# Patient Record
Sex: FEMALE | Race: WHITE | NOT HISPANIC OR LATINO | Employment: UNEMPLOYED | URBAN - METROPOLITAN AREA
[De-identification: names, ages, dates, MRNs, and addresses within clinical notes are randomized per-mention and may not be internally consistent; named-entity substitution may affect disease eponyms.]

---

## 2018-02-21 ENCOUNTER — TRANSCRIBE ORDERS (OUTPATIENT)
Dept: WOMENS IMAGING | Facility: HOSPITAL | Age: 28
End: 2018-02-21

## 2018-02-21 DIAGNOSIS — O34.32 CERVICAL FUNNELING AFFECTING PREGNANCY IN SECOND TRIMESTER: Primary | ICD-10-CM

## 2018-02-26 ENCOUNTER — OFFICE VISIT (OUTPATIENT)
Dept: OBSTETRICS AND GYNECOLOGY | Facility: HOSPITAL | Age: 28
End: 2018-02-26

## 2018-02-26 ENCOUNTER — HOSPITAL ENCOUNTER (OUTPATIENT)
Dept: WOMENS IMAGING | Facility: HOSPITAL | Age: 28
Discharge: HOME | End: 2018-02-26
Attending: OBSTETRICS & GYNECOLOGY | Admitting: OBSTETRICS & GYNECOLOGY

## 2018-02-26 VITALS
DIASTOLIC BLOOD PRESSURE: 76 MMHG | HEIGHT: 62 IN | BODY MASS INDEX: 42.88 KG/M2 | SYSTOLIC BLOOD PRESSURE: 127 MMHG | WEIGHT: 233 LBS

## 2018-02-26 DIAGNOSIS — N88.3 INCOMPETENT CERVIX: ICD-10-CM

## 2018-02-26 DIAGNOSIS — O34.32 CERVICAL FUNNELING AFFECTING PREGNANCY IN SECOND TRIMESTER: ICD-10-CM

## 2018-02-26 DIAGNOSIS — O26.879 SHORT CERVIX AFFECTING PREGNANCY: Primary | ICD-10-CM

## 2018-02-26 DIAGNOSIS — E66.01 MORBID OBESITY WITH BMI OF 40.0-44.9, ADULT (HCC): ICD-10-CM

## 2018-02-26 PROCEDURE — 76811 OB US DETAILED SNGL FETUS: CPT | Performed by: OBSTETRICS & GYNECOLOGY

## 2018-02-26 PROCEDURE — 76811 OB US DETAILED SNGL FETUS: CPT

## 2018-02-26 PROCEDURE — 99242 OFF/OP CONSLTJ NEW/EST SF 20: CPT | Performed by: OBSTETRICS & GYNECOLOGY

## 2018-02-26 PROCEDURE — 76817 TRANSVAGINAL US OBSTETRIC: CPT

## 2018-02-26 PROCEDURE — 76817 TRANSVAGINAL US OBSTETRIC: CPT | Performed by: OBSTETRICS & GYNECOLOGY

## 2018-02-26 RX ORDER — PRENATAL WITH FERROUS FUM AND FOLIC ACID 3080; 920; 120; 400; 22; 1.84; 3; 20; 10; 1; 12; 200; 27; 25; 2 [IU]/1; [IU]/1; MG/1; [IU]/1; MG/1; MG/1; MG/1; MG/1; MG/1; MG/1; UG/1; MG/1; MG/1; MG/1; MG/1
TABLET ORAL DAILY
COMMUNITY

## 2018-02-26 NOTE — PROGRESS NOTES
Pt denies all s/s PTL.  Denies other problems.  Next OB visit.  Denies having had any genetic screening.

## 2018-02-27 ENCOUNTER — HOSPITAL ENCOUNTER (INPATIENT)
Facility: HOSPITAL | Age: 28
LOS: 3 days | Discharge: HOME | End: 2018-03-02
Attending: OBSTETRICS & GYNECOLOGY | Admitting: OBSTETRICS & GYNECOLOGY

## 2018-02-27 DIAGNOSIS — O26.879 SHORT CERVIX AFFECTING PREGNANCY: Primary | ICD-10-CM

## 2018-02-27 PROBLEM — O34.32 INCOMPETENT CERVIX DURING SECOND TRIMESTER, ANTEPARTUM: Status: ACTIVE | Noted: 2018-02-27

## 2018-02-27 PROBLEM — O26.872 SHORT CERVIX DURING PREGNANCY IN SECOND TRIMESTER: Status: ACTIVE | Noted: 2018-02-27

## 2018-02-27 LAB
ABO GROUP BLD: NORMAL
BLD GP AB SCN SERPL QL: NEGATIVE
DEPRECATED RDW RBC AUTO: 43.4 FL (ref 37–54)
ERYTHROCYTE [DISTWIDTH] IN BLOOD BY AUTOMATED COUNT: 13.4 % (ref 11.3–14.5)
HCT VFR BLD AUTO: 37.6 % (ref 34.5–44)
HGB BLD-MCNC: 12.7 G/DL (ref 11.5–15.5)
MCH RBC QN AUTO: 30.5 PG (ref 27–31)
MCHC RBC AUTO-ENTMCNC: 33.8 G/DL (ref 32–36)
MCV RBC AUTO: 90.4 FL (ref 80–99)
PLATELET # BLD AUTO: 199 10*3/MM3 (ref 150–450)
PMV BLD AUTO: 11.2 FL (ref 6–12)
RBC # BLD AUTO: 4.16 10*6/MM3 (ref 3.89–5.14)
RH BLD: POSITIVE
WBC NRBC COR # BLD: 10 10*3/MM3 (ref 3.5–10.8)

## 2018-02-27 PROCEDURE — 25010000002 MAGNESIUM SULFATE-LACT RINGERS 40 GM/580ML SOLUTION: Performed by: OBSTETRICS & GYNECOLOGY

## 2018-02-27 PROCEDURE — 86900 BLOOD TYPING SEROLOGIC ABO: CPT | Performed by: OBSTETRICS & GYNECOLOGY

## 2018-02-27 PROCEDURE — 86901 BLOOD TYPING SEROLOGIC RH(D): CPT | Performed by: OBSTETRICS & GYNECOLOGY

## 2018-02-27 PROCEDURE — 86850 RBC ANTIBODY SCREEN: CPT | Performed by: OBSTETRICS & GYNECOLOGY

## 2018-02-27 PROCEDURE — S0260 H&P FOR SURGERY: HCPCS | Performed by: OBSTETRICS & GYNECOLOGY

## 2018-02-27 PROCEDURE — 85027 COMPLETE CBC AUTOMATED: CPT | Performed by: OBSTETRICS & GYNECOLOGY

## 2018-02-27 RX ORDER — MAGNESIUM SULFATE HEPTAHYDRATE 40 MG/ML
6 INJECTION, SOLUTION INTRAVENOUS ONCE
Status: DISCONTINUED | OUTPATIENT
Start: 2018-02-27 | End: 2018-02-27

## 2018-02-27 RX ORDER — SODIUM CHLORIDE 0.9 % (FLUSH) 0.9 %
1-10 SYRINGE (ML) INJECTION AS NEEDED
Status: DISCONTINUED | OUTPATIENT
Start: 2018-02-27 | End: 2018-02-28

## 2018-02-27 RX ORDER — MAGNESIUM SULF/RINGERS LACTATE 40 G/500ML
PLASTIC BAG, INJECTION (ML) INTRAVENOUS
Status: COMPLETED
Start: 2018-02-27 | End: 2018-02-28

## 2018-02-27 RX ORDER — MAGNESIUM SULFATE HEPTAHYDRATE 40 MG/ML
2 INJECTION, SOLUTION INTRAVENOUS CONTINUOUS
Status: DISCONTINUED | OUTPATIENT
Start: 2018-02-27 | End: 2018-02-27

## 2018-02-27 RX ORDER — LIDOCAINE HYDROCHLORIDE 10 MG/ML
5 INJECTION, SOLUTION EPIDURAL; INFILTRATION; INTRACAUDAL; PERINEURAL AS NEEDED
Status: DISCONTINUED | OUTPATIENT
Start: 2018-02-27 | End: 2018-02-28

## 2018-02-27 RX ORDER — MAGNESIUM SULF/RINGERS LACTATE 40 G/500ML
2 PLASTIC BAG, INJECTION (ML) INTRAVENOUS CONTINUOUS
Status: DISPENSED | OUTPATIENT
Start: 2018-02-27 | End: 2018-02-28

## 2018-02-27 RX ORDER — AZITHROMYCIN 250 MG/1
250 TABLET, FILM COATED ORAL DAILY
Status: DISCONTINUED | OUTPATIENT
Start: 2018-02-28 | End: 2018-03-02 | Stop reason: HOSPADM

## 2018-02-27 RX ORDER — AZITHROMYCIN 250 MG/1
500 TABLET, FILM COATED ORAL DAILY
Status: COMPLETED | OUTPATIENT
Start: 2018-02-27 | End: 2018-02-27

## 2018-02-27 RX ORDER — SODIUM CHLORIDE, SODIUM LACTATE, POTASSIUM CHLORIDE, CALCIUM CHLORIDE 600; 310; 30; 20 MG/100ML; MG/100ML; MG/100ML; MG/100ML
96 INJECTION, SOLUTION INTRAVENOUS CONTINUOUS
Status: DISCONTINUED | OUTPATIENT
Start: 2018-02-27 | End: 2018-02-28

## 2018-02-27 RX ADMIN — SODIUM CHLORIDE, POTASSIUM CHLORIDE, SODIUM LACTATE AND CALCIUM CHLORIDE 96 ML/HR: 600; 310; 30; 20 INJECTION, SOLUTION INTRAVENOUS at 12:54

## 2018-02-27 RX ADMIN — AZITHROMYCIN 500 MG: 250 TABLET, FILM COATED ORAL at 14:58

## 2018-02-27 RX ADMIN — AMPICILLIN SODIUM 2 G: 2 INJECTION, POWDER, FOR SOLUTION INTRAMUSCULAR; INTRAVENOUS at 20:07

## 2018-02-27 RX ADMIN — Medication 2 G/HR: at 13:26

## 2018-02-27 RX ADMIN — AMPICILLIN SODIUM 2 G: 2 INJECTION, POWDER, FOR SOLUTION INTRAMUSCULAR; INTRAVENOUS at 14:57

## 2018-02-28 ENCOUNTER — ANESTHESIA (OUTPATIENT)
Dept: LABOR AND DELIVERY | Facility: HOSPITAL | Age: 28
End: 2018-02-28

## 2018-02-28 ENCOUNTER — ANESTHESIA EVENT (OUTPATIENT)
Dept: LABOR AND DELIVERY | Facility: HOSPITAL | Age: 28
End: 2018-02-28

## 2018-02-28 PROCEDURE — 25010000002 MIDAZOLAM PER 1 MG: Performed by: NURSE ANESTHETIST, CERTIFIED REGISTERED

## 2018-02-28 PROCEDURE — 25010000002 FENTANYL CITRATE (PF) 100 MCG/2ML SOLUTION: Performed by: NURSE ANESTHETIST, CERTIFIED REGISTERED

## 2018-02-28 PROCEDURE — 0UVC7ZZ RESTRICTION OF CERVIX, VIA NATURAL OR ARTIFICIAL OPENING: ICD-10-PCS | Performed by: OBSTETRICS & GYNECOLOGY

## 2018-02-28 PROCEDURE — 59320 REVISION OF CERVIX: CPT | Performed by: OBSTETRICS & GYNECOLOGY

## 2018-02-28 PROCEDURE — 25010000002 MAGNESIUM SULFATE-LACT RINGERS 40 GM/580ML SOLUTION: Performed by: OBSTETRICS & GYNECOLOGY

## 2018-02-28 RX ORDER — PRENATAL VIT/IRON FUM/FOLIC AC 27MG-0.8MG
1 TABLET ORAL DAILY
Status: DISCONTINUED | OUTPATIENT
Start: 2018-02-28 | End: 2018-03-02 | Stop reason: HOSPADM

## 2018-02-28 RX ORDER — MAGNESIUM SULF/RINGERS LACTATE 40 G/500ML
2 PLASTIC BAG, INJECTION (ML) INTRAVENOUS CONTINUOUS
Status: DISCONTINUED | OUTPATIENT
Start: 2018-02-28 | End: 2018-03-01

## 2018-02-28 RX ORDER — BUPIVACAINE HYDROCHLORIDE 7.5 MG/ML
INJECTION, SOLUTION EPIDURAL; RETROBULBAR AS NEEDED
Status: DISCONTINUED | OUTPATIENT
Start: 2018-02-28 | End: 2018-02-28 | Stop reason: SURG

## 2018-02-28 RX ORDER — OXYCODONE HYDROCHLORIDE AND ACETAMINOPHEN 5; 325 MG/1; MG/1
1 TABLET ORAL EVERY 6 HOURS PRN
Status: DISCONTINUED | OUTPATIENT
Start: 2018-02-28 | End: 2018-03-02 | Stop reason: HOSPADM

## 2018-02-28 RX ORDER — DEXTROSE AND SODIUM CHLORIDE 5; .2 G/100ML; G/100ML
96 INJECTION, SOLUTION INTRAVENOUS CONTINUOUS
Status: DISCONTINUED | OUTPATIENT
Start: 2018-02-28 | End: 2018-03-02 | Stop reason: HOSPADM

## 2018-02-28 RX ORDER — MIDAZOLAM HYDROCHLORIDE 1 MG/ML
INJECTION INTRAMUSCULAR; INTRAVENOUS AS NEEDED
Status: DISCONTINUED | OUTPATIENT
Start: 2018-02-28 | End: 2018-02-28 | Stop reason: SURG

## 2018-02-28 RX ORDER — FENTANYL CITRATE 50 UG/ML
INJECTION, SOLUTION INTRAMUSCULAR; INTRAVENOUS AS NEEDED
Status: DISCONTINUED | OUTPATIENT
Start: 2018-02-28 | End: 2018-02-28 | Stop reason: SURG

## 2018-02-28 RX ORDER — TRISODIUM CITRATE DIHYDRATE AND CITRIC ACID MONOHYDRATE 500; 334 MG/5ML; MG/5ML
30 SOLUTION ORAL ONCE
Status: COMPLETED | OUTPATIENT
Start: 2018-02-28 | End: 2018-02-28

## 2018-02-28 RX ADMIN — AMPICILLIN SODIUM 2 G: 2 INJECTION, POWDER, FOR SOLUTION INTRAMUSCULAR; INTRAVENOUS at 14:43

## 2018-02-28 RX ADMIN — EPHEDRINE SULFATE 10 MG: 50 INJECTION INTRAMUSCULAR; INTRAVENOUS; SUBCUTANEOUS at 07:56

## 2018-02-28 RX ADMIN — FENTANYL CITRATE 20 MCG: 50 INJECTION, SOLUTION INTRAMUSCULAR; INTRAVENOUS at 07:49

## 2018-02-28 RX ADMIN — SODIUM CHLORIDE, POTASSIUM CHLORIDE, SODIUM LACTATE AND CALCIUM CHLORIDE 2000 ML/HR: 600; 310; 30; 20 INJECTION, SOLUTION INTRAVENOUS at 07:02

## 2018-02-28 RX ADMIN — AZITHROMYCIN 250 MG: 250 TABLET, FILM COATED ORAL at 11:59

## 2018-02-28 RX ADMIN — OXYCODONE AND ACETAMINOPHEN 1 TABLET: 5; 325 TABLET ORAL at 12:01

## 2018-02-28 RX ADMIN — Medication 2 G/HR: at 07:32

## 2018-02-28 RX ADMIN — DEXTROSE AND SODIUM CHLORIDE 96 ML/HR: 5; 200 INJECTION, SOLUTION INTRAVENOUS at 10:03

## 2018-02-28 RX ADMIN — BUPIVACAINE HYDROCHLORIDE 1.4 ML: 7.5 INJECTION, SOLUTION EPIDURAL; RETROBULBAR at 07:49

## 2018-02-28 RX ADMIN — SODIUM CHLORIDE, POTASSIUM CHLORIDE, SODIUM LACTATE AND CALCIUM CHLORIDE: 600; 310; 30; 20 INJECTION, SOLUTION INTRAVENOUS at 08:07

## 2018-02-28 RX ADMIN — AMPICILLIN SODIUM 2 G: 2 INJECTION, POWDER, FOR SOLUTION INTRAMUSCULAR; INTRAVENOUS at 02:05

## 2018-02-28 RX ADMIN — AMPICILLIN SODIUM 2 G: 2 INJECTION, POWDER, FOR SOLUTION INTRAMUSCULAR; INTRAVENOUS at 20:01

## 2018-02-28 RX ADMIN — MIDAZOLAM HYDROCHLORIDE 0.5 MG: 1 INJECTION, SOLUTION INTRAMUSCULAR; INTRAVENOUS at 07:45

## 2018-02-28 RX ADMIN — SODIUM CITRATE AND CITRIC ACID MONOHYDRATE 30 ML: 500; 334 SOLUTION ORAL at 07:30

## 2018-02-28 RX ADMIN — PRENATAL VIT W/ FE FUMARATE-FA TAB 27-0.8 MG 1 TABLET: 27-0.8 TAB at 11:59

## 2018-02-28 RX ADMIN — AMPICILLIN SODIUM 2 G: 2 INJECTION, POWDER, FOR SOLUTION INTRAMUSCULAR; INTRAVENOUS at 08:05

## 2018-02-28 RX ADMIN — SODIUM CHLORIDE, POTASSIUM CHLORIDE, SODIUM LACTATE AND CALCIUM CHLORIDE 1000 ML/HR: 600; 310; 30; 20 INJECTION, SOLUTION INTRAVENOUS at 07:30

## 2018-02-28 RX ADMIN — PROGESTERONE 200 MG: 100 CAPSULE ORAL at 21:14

## 2018-03-01 PROBLEM — O34.32 INCOMPETENT CERVIX DURING SECOND TRIMESTER, ANTEPARTUM: Status: RESOLVED | Noted: 2018-02-27 | Resolved: 2018-03-01

## 2018-03-01 PROCEDURE — 25010000002 MAGNESIUM SULFATE-LACT RINGERS 40 GM/580ML SOLUTION: Performed by: OBSTETRICS & GYNECOLOGY

## 2018-03-01 PROCEDURE — 59025 FETAL NON-STRESS TEST: CPT

## 2018-03-01 PROCEDURE — 99024 POSTOP FOLLOW-UP VISIT: CPT | Performed by: OBSTETRICS & GYNECOLOGY

## 2018-03-01 RX ADMIN — DEXTROSE AND SODIUM CHLORIDE 96 ML/HR: 5; 200 INJECTION, SOLUTION INTRAVENOUS at 20:36

## 2018-03-01 RX ADMIN — AMPICILLIN SODIUM 2 G: 2 INJECTION, POWDER, FOR SOLUTION INTRAMUSCULAR; INTRAVENOUS at 12:09

## 2018-03-01 RX ADMIN — PROGESTERONE 200 MG: 100 CAPSULE ORAL at 20:36

## 2018-03-01 RX ADMIN — AMPICILLIN SODIUM 2 G: 2 INJECTION, POWDER, FOR SOLUTION INTRAMUSCULAR; INTRAVENOUS at 18:03

## 2018-03-01 RX ADMIN — AZITHROMYCIN 250 MG: 250 TABLET, FILM COATED ORAL at 12:09

## 2018-03-01 RX ADMIN — AMPICILLIN SODIUM 2 G: 2 INJECTION, POWDER, FOR SOLUTION INTRAMUSCULAR; INTRAVENOUS at 06:00

## 2018-03-01 RX ADMIN — DEXTROSE AND SODIUM CHLORIDE 96 ML/HR: 5; 200 INJECTION, SOLUTION INTRAVENOUS at 09:54

## 2018-03-01 RX ADMIN — Medication 2 G/HR: at 06:37

## 2018-03-01 RX ADMIN — PRENATAL VIT W/ FE FUMARATE-FA TAB 27-0.8 MG 1 TABLET: 27-0.8 TAB at 08:41

## 2018-03-01 RX ADMIN — AMPICILLIN SODIUM 2 G: 2 INJECTION, POWDER, FOR SOLUTION INTRAMUSCULAR; INTRAVENOUS at 23:17

## 2018-03-01 NOTE — ANESTHESIA POSTPROCEDURE EVALUATION
Patient: Pau Canela    Procedure Summary     Date Anesthesia Start Anesthesia Stop Room / Location    02/28/18 0740 0817  [facility] /  [facility]       Procedure Diagnosis Surgeon Provider    CERVICAL CERCLAGE, VAGINAL * 21WKS * (Bilateral Vagina) No diagnosis on file. Douglas A Milligan, MD William White, MD          Anesthesia Type: spinal  Last vitals  BP   134/61 (03/01/18 0632)   Temp   97.8 °F (36.6 °C) (03/01/18 0332)   Pulse   91 (03/01/18 0632)   Resp   16 (03/01/18 0630)     SpO2   97 % (02/28/18 0816)     Post Anesthesia Care and Evaluation    Patient location during evaluation: bedside  Patient participation: complete - patient participated  Level of consciousness: awake and alert  Pain score: 0  Pain management: satisfactory to patient  Airway patency: patent  Anesthetic complications: No anesthetic complications  PONV Status: none  Cardiovascular status: acceptable  Respiratory status: acceptable  Hydration status: acceptable

## 2018-03-01 NOTE — PLAN OF CARE
Problem: Patient Care Overview (Adult)  Goal: Plan of Care Review  Outcome: Ongoing (interventions implemented as appropriate)   02/27/18 2349 02/28/18 1930   Coping/Psychosocial Response Interventions   Plan Of Care Reviewed With --  patient   Patient Care Overview   Progress progress toward functional goals as expected --      Goal: Adult Individualization and Mutuality   02/27/18 2349 02/28/18 2351   Individualization   Patient Specific Preferences --  Pt's pain will remain under control   Patient Specific Goals Pt will remain pregnant --    Patient Specific Interventions Continuous TOCO, preventative magnesium sulfate, prophylactic antibiotics --      Goal: Discharge Needs Assessment   02/27/18 2349   Discharge Needs Assessment   Concerns To Be Addressed denies needs/concerns at this time   Equipment Needed After Discharge none   Discharge Disposition still a patient   Current Health   Anticipated Changes Related to Illness none   Self-Care   Equipment Currently Used at Home none   Living Environment   Transportation Available car

## 2018-03-01 NOTE — PROGRESS NOTES
Daily Progress Note    Patient name: Pau Canela  YOB: 1990   MRN: [id number]  Referring Provider: Milligan,Douglas A  Admission Date: 2018  Date of Service: 3/1/2018    Pau Canela is a 27 y.o.    at 21w5d  admitted on 2018 for Incompetent cervix    Hospital day 2    Diagnoses:   Patient Active Problem List   Diagnosis   • Morbid obesity with BMI of 40.0-44.9, adult   • Short cervix affecting pregnancy   • Incompetent cervix   • Short cervix during pregnancy in second trimester       Prenatal Labs  Lab Results   Component Value Date    HGB 12.7 2018    ABO A 2018    RH Positive 2018    ABSCRN Negative 2018       Subjective:      Pau has no complaints today.  Reports fetal movement is normal  No contractions  Denies leakage of amniotic fluid.  Denies vaginal bleeding    Objective:     Vital signs:  Vital Signs Range for the last 24 hours  Temperature: Temp:  [97.8 °F (36.6 °C)-99.2 °F (37.3 °C)] 97.8 °F (36.6 °C)   Temp Source: Temp src: Oral   BP: BP: ()/(38-88) 134/61   Pulse: Heart Rate:  [72-95] 91   Respirations: Resp:  [16-18] 16   Weight: 106 kg (233 lb)     General: Alert & oriented x4, in no apparent distress  HEENT: Grossly normal  Resp: Unlabored breathing  Abdomen: Soft, nontender  Uterus: Gravid, nontender  Extremities: Nontender, no pain, no edema   Neurologic:  Alert & oriented x 4,  no focal deficits noted  Psychiatric:  Speech and behavior appropriate     Non-Stress Test:  Fetal Heart Rate Assessment   Method: Fetal HR Assessment Method: intermittent auscultation, using Doppler   Beats/min: Fetal HR (Beats/Min): 150   Baseline: Fetal HR Baseline: normal range (110-160 bpm)   Varibility:     Accels:     Decels:     Tracing Category:       Uterine Assessment   Method: Method: TOCO (external toco transducer)   Frequency (min): Contraction Frequency (min): x3   Ctx Count in 10 min:     Duration: Contraction Duration (sec): 40-60   Intensity:  Contraction Intensity: no contractions   Intensity by IUPC:     Resting Tone: Uterine Resting Tone: soft by palpation   Resting Tone by IUPC:     [facility] Units:       Cervix: Exam by:     Dilation:     Effacement:     Station:         Most recent ultrasound:  See viewpoint    Medications:    ampicillin 2 g Intravenous Q6H   azithromycin 250 mg Oral Daily   prenatal vitamin 27-0.8 1 tablet Oral Daily   progesterone 200 mg Vaginal Nightly      oxyCODONE-acetaminophen    Labs:  Lab Results   Component Value Date    WBC 10.00 2018    HGB 12.7 2018    HCT 37.6 2018    MCV 90.4 2018     2018       Results from last 7 days  Lab Units 18  1220   ABO TYPING  A   RH TYPING  Positive   ANTIBODY SCREEN  Negative       Assessment/Plan:      Ami is a 27 y.o.    at 21w5d with Incompetent cervix    Hospital Problem List     Short cervix during pregnancy in second trimester    Incompetent cervix during second trimester, antepartum          Doing well, will D/C mag and cont obs..    Douglas A. Milligan, MD  3/1/2018 7:51 AM

## 2018-03-02 VITALS
BODY MASS INDEX: 42.88 KG/M2 | TEMPERATURE: 98.1 F | SYSTOLIC BLOOD PRESSURE: 131 MMHG | OXYGEN SATURATION: 97 % | WEIGHT: 233 LBS | RESPIRATION RATE: 18 BRPM | HEART RATE: 68 BPM | DIASTOLIC BLOOD PRESSURE: 73 MMHG | HEIGHT: 62 IN

## 2018-03-02 PROCEDURE — 99024 POSTOP FOLLOW-UP VISIT: CPT | Performed by: OBSTETRICS & GYNECOLOGY

## 2018-03-02 RX ORDER — AZITHROMYCIN 250 MG/1
250 TABLET, FILM COATED ORAL DAILY
Qty: 3 TABLET | Refills: 0 | Status: SHIPPED | OUTPATIENT
Start: 2018-03-02 | End: 2018-03-05

## 2018-03-02 RX ORDER — AMOXICILLIN 500 MG/1
500 CAPSULE ORAL 3 TIMES DAILY
Qty: 9 CAPSULE | Refills: 0 | Status: SHIPPED | OUTPATIENT
Start: 2018-03-02 | End: 2018-03-05

## 2018-03-02 RX ADMIN — AMPICILLIN SODIUM 2 G: 2 INJECTION, POWDER, FOR SOLUTION INTRAMUSCULAR; INTRAVENOUS at 06:07

## 2018-03-02 RX ADMIN — DEXTROSE AND SODIUM CHLORIDE 96 ML/HR: 5; 200 INJECTION, SOLUTION INTRAVENOUS at 06:07

## 2018-03-02 NOTE — DISCHARGE SUMMARY
L Marilyn R  Patient Name: Pau Canela  : 1990  MRN: [id number]  Date of Service: 3/2/2018  Referring Provider: Milligan,Douglas A     ID: 27 y.o.  at 21w6d    Admission Diagnosis: Short cervix during pregnancy in second trimester [O26.872]  Incompetent cervix during second trimester, antepartum [O34.32]  Incompetent cervix during second trimester, antepartum [O34.32]    Discharge Diagnosis:   Patient Active Problem List   Diagnosis   • Morbid obesity with BMI of 40.0-44.9, adult   • Short cervix affecting pregnancy   • Incompetent cervix   • Short cervix during pregnancy in second trimester       Date of Admission: 2018 11:21 AM    Date of Discharge: 3/2/2018    Discharge Condition: Stable    Discharge to: Home    Discharge Medications:    Pau Canela   Home Medication Instructions ZACH:[id number]    Printed on:18 0715   Medication Information                      amoxicillin (AMOXIL) 500 MG capsule  Take 1 capsule by mouth 3 (Three) Times a Day for 3 days.             azithromycin (ZITHROMAX) 250 MG tablet  Take 1 tablet by mouth Daily for 3 days. Take 2 tablets (500 mg) on  Day 1,  followed by 1 tablet (250 mg) once daily on Days 2 through 5.             Prenatal Vit-Fe Fumarate-FA (PRENATAL 27-1) 27-1 MG tablet tablet  Take  by mouth Daily.             progesterone (PROMETRIUM) 200 MG capsule  Insert 200 mg into the vagina Every Night.                 Discharge Diet: regular    Discharge Activity: bed rest with BRP    Follow up appointments:   Your Scheduled Appointments     Mar 19, 2018 11:00 AM EDT   Follow Up with  [facility] SCHEDULE   [facility] (--)    [address] [place] [address]   [phone]           Arrive 15 minutes prior to appointment.            Mar 19, 2018 11:15 AM EDT    [facility] US 1   [facility] ([place])    [address] [facility] [address] [phone]           Patient  is to be hydrated                  Hospital summary:      Ami was admitted for Incompetent cervix.  Patient underwent emergency cerclage.  Patient did well post op and is d/c'd home on post op day 2.    She did not receive a course of  corticosteroids during her admission.      She did not receive magnesium sulfate during her admission.      Her obstetric ultrasounds and fetal testing were reassuring during her admission.  Her condition was determined to be appropriate for outpatient management and she was discharged home in stable condition.  She was instructed to follow up in 2 weeks with  [facility] [place] in 2 weeks.    Douglas A. Milligan, MD  7:15 AM

## 2018-03-02 NOTE — PLAN OF CARE
Problem: Patient Care Overview (Adult)  Goal: Plan of Care Review  Outcome: Ongoing (interventions implemented as appropriate)   18 0526   Coping/Psychosocial Response Interventions   Plan Of Care Reviewed With patient   Patient Care Overview   Progress no change       Problem:  Delivery (Adult,Obstetrics,Pediatric)  Goal: Signs and Symptoms of Listed Potential Problems Will be Absent or Manageable ( Delivery)  Outcome: Unable to achieve outcome(s) by discharge Date Met: 18

## 2018-03-02 NOTE — DISCHARGE INSTR - APPOINTMENTS
Keep all follow up appointments. Call your doctor if you have any questions or concerns, or return to the nearest hospital.

## 2018-03-19 ENCOUNTER — OFFICE VISIT (OUTPATIENT)
Dept: OBSTETRICS AND GYNECOLOGY | Facility: HOSPITAL | Age: 28
End: 2018-03-19

## 2018-03-19 ENCOUNTER — HOSPITAL ENCOUNTER (OUTPATIENT)
Dept: WOMENS IMAGING | Facility: HOSPITAL | Age: 28
Discharge: HOME | End: 2018-03-19
Attending: OBSTETRICS & GYNECOLOGY | Admitting: OBSTETRICS & GYNECOLOGY

## 2018-03-19 VITALS — BODY MASS INDEX: 42.32 KG/M2 | DIASTOLIC BLOOD PRESSURE: 69 MMHG | WEIGHT: 231.4 LBS | SYSTOLIC BLOOD PRESSURE: 119 MMHG

## 2018-03-19 DIAGNOSIS — E66.01 MORBID OBESITY WITH BMI OF 40.0-44.9, ADULT (HCC): ICD-10-CM

## 2018-03-19 DIAGNOSIS — N88.3 INCOMPETENT CERVIX: Primary | ICD-10-CM

## 2018-03-19 DIAGNOSIS — N88.3 INCOMPETENT CERVIX: ICD-10-CM

## 2018-03-19 DIAGNOSIS — O26.879 SHORT CERVIX AFFECTING PREGNANCY: ICD-10-CM

## 2018-03-19 DIAGNOSIS — Z34.90 PREGNANCY, UNSPECIFIED GESTATIONAL AGE: ICD-10-CM

## 2018-03-19 PROCEDURE — 76816 OB US FOLLOW-UP PER FETUS: CPT | Performed by: OBSTETRICS & GYNECOLOGY

## 2018-03-19 PROCEDURE — 76816 OB US FOLLOW-UP PER FETUS: CPT

## 2018-04-30 ENCOUNTER — HOSPITAL ENCOUNTER (OUTPATIENT)
Dept: WOMENS IMAGING | Facility: HOSPITAL | Age: 28
Discharge: HOME | End: 2018-04-30
Attending: OBSTETRICS & GYNECOLOGY | Admitting: OBSTETRICS & GYNECOLOGY

## 2018-04-30 ENCOUNTER — OFFICE VISIT (OUTPATIENT)
Dept: OBSTETRICS AND GYNECOLOGY | Facility: HOSPITAL | Age: 28
End: 2018-04-30

## 2018-04-30 VITALS — DIASTOLIC BLOOD PRESSURE: 72 MMHG | WEIGHT: 232 LBS | BODY MASS INDEX: 42.43 KG/M2 | SYSTOLIC BLOOD PRESSURE: 131 MMHG

## 2018-04-30 DIAGNOSIS — N88.3 INCOMPETENT CERVIX: ICD-10-CM

## 2018-04-30 DIAGNOSIS — Z34.90 PREGNANCY, UNSPECIFIED GESTATIONAL AGE: ICD-10-CM

## 2018-04-30 DIAGNOSIS — O24.419 GESTATIONAL DIABETES MELLITUS (GDM) IN THIRD TRIMESTER, GESTATIONAL DIABETES METHOD OF CONTROL UNSPECIFIED: Primary | ICD-10-CM

## 2018-04-30 DIAGNOSIS — E66.01 MORBID OBESITY WITH BMI OF 40.0-44.9, ADULT (HCC): ICD-10-CM

## 2018-04-30 PROCEDURE — 76816 OB US FOLLOW-UP PER FETUS: CPT | Performed by: OBSTETRICS & GYNECOLOGY

## 2018-04-30 PROCEDURE — 76816 OB US FOLLOW-UP PER FETUS: CPT

## 2018-04-30 RX ORDER — GLYBURIDE 2.5 MG/1
5 TABLET ORAL 2 TIMES DAILY WITH MEALS
COMMUNITY

## 2018-04-30 NOTE — PROGRESS NOTES
GDM. Checking FSBS QID. Fasting sugars 80-90.  Seeing her OB twice weekly now. Next appt this Thursday.

## 2018-05-22 ENCOUNTER — HOSPITAL ENCOUNTER (OUTPATIENT)
Dept: WOMENS IMAGING | Facility: HOSPITAL | Age: 28
Discharge: HOME | End: 2018-05-22
Attending: OBSTETRICS & GYNECOLOGY | Admitting: OBSTETRICS & GYNECOLOGY

## 2018-05-22 ENCOUNTER — OFFICE VISIT (OUTPATIENT)
Dept: OBSTETRICS AND GYNECOLOGY | Facility: HOSPITAL | Age: 28
End: 2018-05-22

## 2018-05-22 VITALS — BODY MASS INDEX: 43.35 KG/M2 | DIASTOLIC BLOOD PRESSURE: 80 MMHG | WEIGHT: 237 LBS | SYSTOLIC BLOOD PRESSURE: 119 MMHG

## 2018-05-22 DIAGNOSIS — O26.872 SHORT CERVIX DURING PREGNANCY IN SECOND TRIMESTER: ICD-10-CM

## 2018-05-22 DIAGNOSIS — O24.419 GESTATIONAL DIABETES MELLITUS (GDM) IN THIRD TRIMESTER, GESTATIONAL DIABETES METHOD OF CONTROL UNSPECIFIED: ICD-10-CM

## 2018-05-22 DIAGNOSIS — N88.3 INCOMPETENT CERVIX: ICD-10-CM

## 2018-05-22 DIAGNOSIS — E66.01 MORBID OBESITY WITH BMI OF 40.0-44.9, ADULT (HCC): ICD-10-CM

## 2018-05-22 DIAGNOSIS — E66.01 MORBID OBESITY WITH BMI OF 40.0-44.9, ADULT (HCC): Primary | ICD-10-CM

## 2018-05-22 PROCEDURE — 76816 OB US FOLLOW-UP PER FETUS: CPT | Performed by: OBSTETRICS & GYNECOLOGY

## 2018-05-22 PROCEDURE — 76816 OB US FOLLOW-UP PER FETUS: CPT

## 2018-05-22 NOTE — PROGRESS NOTES
Documentation of the ultasound findings, images, and interpretations with be available in the patient's Viewpoint report located in the Chart Review Imaging tab in [employer].

## 2018-05-22 NOTE — PROGRESS NOTES
"Pt denies problems with pregnancy or s/s PTL.  \"Seeing OB every Monday and Thursday.\"  Denies having any genetic screening.  Reports average \"fasting glucoses around 80, 1 hour after meals 110-125\" with her OB managing.  "

## 2018-06-07 ENCOUNTER — TELEPHONE (OUTPATIENT)
Dept: WOMENS IMAGING | Facility: HOSPITAL | Age: 28
End: 2018-06-07

## 2018-06-07 NOTE — TELEPHONE ENCOUNTER
----- Message from George Serrano sent at 6/7/2018 12:24 PM EDT -----  Regarding: s/p cerclage   Contact: [phone]  Patient called and stated that per her last visit, she was advised to call prior to her appointment on 06/11 if the baby is still breech to figure out if she should come on in to have cerclage removed or since [place] is leaning toward a c/s to leave it and they remove it. She said Sd wanted to know as well     I spoke with Dr. Milligan and he states if the baby stays breech [place] may remove the cerclage at the time of pt's C/S. I relayed this information to the pt and she VU. Pt states she is to see OB in [place] on 6/11/18 and if the baby has turned vertex she will call [facility] to reschedule cerclage removal.

## 2018-06-11 ENCOUNTER — APPOINTMENT (OUTPATIENT)
Dept: WOMENS IMAGING | Facility: HOSPITAL | Age: 28
End: 2018-06-11
Attending: OBSTETRICS & GYNECOLOGY

## 2023-11-29 ENCOUNTER — TRANSCRIBE ORDERS (OUTPATIENT)
Dept: OBSTETRICS AND GYNECOLOGY | Facility: HOSPITAL | Age: 33
End: 2023-11-29
Payer: COMMERCIAL

## 2023-11-29 DIAGNOSIS — O99.210 OBESITY IN PREGNANCY, ANTEPARTUM: ICD-10-CM

## 2023-11-29 DIAGNOSIS — Z34.90 PREGNANCY, UNSPECIFIED GESTATIONAL AGE: ICD-10-CM

## 2023-11-29 DIAGNOSIS — O09.299 HISTORY OF GESTATIONAL DIABETES IN PRIOR PREGNANCY, CURRENTLY PREGNANT: ICD-10-CM

## 2023-11-29 DIAGNOSIS — Z86.32 HISTORY OF GESTATIONAL DIABETES IN PRIOR PREGNANCY, CURRENTLY PREGNANT: ICD-10-CM

## 2023-11-29 DIAGNOSIS — Z98.891 HISTORY OF C-SECTION: ICD-10-CM

## 2023-11-29 DIAGNOSIS — O09.299 HISTORY OF INCOMPETENT CERVIX, CURRENTLY PREGNANT: Primary | ICD-10-CM

## 2023-12-06 ENCOUNTER — HOSPITAL ENCOUNTER (OUTPATIENT)
Dept: WOMENS IMAGING | Facility: HOSPITAL | Age: 33
Discharge: HOME | End: 2023-12-06
Admitting: NURSE PRACTITIONER
Payer: COMMERCIAL

## 2023-12-06 ENCOUNTER — OFFICE VISIT (OUTPATIENT)
Dept: OBSTETRICS AND GYNECOLOGY | Facility: HOSPITAL | Age: 33
End: 2023-12-06
Payer: COMMERCIAL

## 2023-12-06 VITALS — DIASTOLIC BLOOD PRESSURE: 59 MMHG | SYSTOLIC BLOOD PRESSURE: 115 MMHG | WEIGHT: 231.6 LBS | BODY MASS INDEX: 42.36 KG/M2

## 2023-12-06 DIAGNOSIS — Z86.32 HISTORY OF GESTATIONAL DIABETES IN PRIOR PREGNANCY, CURRENTLY PREGNANT: ICD-10-CM

## 2023-12-06 DIAGNOSIS — O99.210 OBESITY IN PREGNANCY, ANTEPARTUM: ICD-10-CM

## 2023-12-06 DIAGNOSIS — Z98.891 HISTORY OF C-SECTION: ICD-10-CM

## 2023-12-06 DIAGNOSIS — Z34.90 PREGNANCY, UNSPECIFIED GESTATIONAL AGE: ICD-10-CM

## 2023-12-06 DIAGNOSIS — O09.299 HISTORY OF INCOMPETENT CERVIX, CURRENTLY PREGNANT: ICD-10-CM

## 2023-12-06 DIAGNOSIS — E66.01 MORBID OBESITY WITH BMI OF 40.0-44.9, ADULT: ICD-10-CM

## 2023-12-06 DIAGNOSIS — N88.3 INCOMPETENT CERVIX: Primary | ICD-10-CM

## 2023-12-06 DIAGNOSIS — O34.219 PREVIOUS CESAREAN DELIVERY AFFECTING PREGNANCY: ICD-10-CM

## 2023-12-06 DIAGNOSIS — O09.299 HISTORY OF GESTATIONAL DIABETES IN PRIOR PREGNANCY, CURRENTLY PREGNANT: ICD-10-CM

## 2023-12-06 PROBLEM — O26.872 SHORT CERVIX DURING PREGNANCY IN SECOND TRIMESTER: Status: RESOLVED | Noted: 2018-02-27 | Resolved: 2023-12-06

## 2023-12-06 PROCEDURE — 76813 OB US NUCHAL MEAS 1 GEST: CPT

## 2023-12-06 PROCEDURE — 76801 OB US < 14 WKS SINGLE FETUS: CPT

## 2023-12-06 RX ORDER — CITALOPRAM HYDROBROMIDE 20 MG
20 TABLET ORAL DAILY
COMMUNITY

## 2023-12-06 NOTE — PROGRESS NOTES
"Documentation of the ultrasound findings, images, and interpretations will be available in the patient's Viewpoint report which is located in the imaging tab in chart review.    Maternal/Fetal Medicine Consult Note     Name: Pau Canela    : 1990     MRN: [id number]     Referring Provider: JOSÉ MIGUEL Saravia    Chief Complaint  Hx of cerclage in Prev c/s and cerclage, Hx of GDM, PCOS, P    Subjective     History of Present Illness:  Pau Canela is a 33 y.o.  11w5d who presents today for incompetent cervix    DIALLO: Estimated Date of Delivery: 24     ROS:   As noted in HPI.     Past Medical History:   Diagnosis Date    History of cervical cerclage 2018    History of gestational diabetes 2018    GDM / was on oral meds    Obesity, Class III, BMI 40-49.9 (morbid obesity)     PCOS (polycystic ovarian syndrome)       Past Surgical History:   Procedure Laterality Date    CERVICAL CERCLAGE Bilateral 2018    Procedure: CERVICAL CERCLAGE, VAGINAL * 21WKS *;  Surgeon: Douglas A Milligan, MD;  Location: [facility] LABOR DELIVERY;  Service:      SECTION  2018    CHOLECYSTECTOMY  2010      OB History          3    Para   1    Term   1       0    AB   1    Living   1         SAB   1    IAB   0    Ectopic   0    Molar   0    Multiple   0    Live Births   1          Obstetric Comments   Fob #1 - Pregnancy #1 and #2  Fob #2 - Pregnancy #3   Pregnancy #1- GDM, Cerclage (rescue) - c/s for breech , oligohydramnios                Objective     Vital Signs  /59   Wt 105 kg (231 lb 9.6 oz)   LMP 09/15/2023   Estimated body mass index is 42.36 kg/m² as calculated from the following:    Height as of 18: 157.5 cm (62\").    Weight as of this encounter: 105 kg (231 lb 9.6 oz).    Physical Exam    Ultrasound Impression:   See Viewpoint    Assessment and Plan     Pau Canela is a 33 y.o.  11w5d who presents today for incompetent cervix    Diagnoses and all orders for this visit:    1. " Incompetent cervix (Primary)  Assessment & Plan:  Patient's last pregnancy was complicated by progressive shortening and funneling of her cervix under observation.  Was felt that this represented a incompetent cervix and she had a rescue cerclage placed.  She progressed to the 37 weeks gestation and had a  section for breech presentation.    We counseled the patient regarding the difficulties in being certain about diagnoses of incompetent cervix.  I strongly suspect that this patient has a true incompetent cervix and we discussed management plans.  Basically the 2 management options are serial ultrasounds following cervical length and placing a cerclage if we see progressive shortening and funneling of the cervix again.  The other option is to place a prophylactic cerclage at 12 to 14 weeks gestation.  We discussed the risks of cerclage and the benefits of prophylactic cerclage compared to rescue cerclage.  After careful consideration the patient elects to proceed with a prophylactic cerclage.  We will schedule this in the next couple weeks and we will see the patient back at 18 weeks gestation to perform her anatomy survey.    Orders:  -     [facility]; [facility]    2. Morbid obesity with BMI of 40.0-44.9, adult  -     [facility]; [facility]    3. Previous  delivery affecting pregnancy  -     [facility]; Future    4. Pregnancy, unspecified gestational age  -     [facility]; Future         Follow Up  Return in about 7 weeks (around 2024).    I spent 24 minutes caring for the patient on the day of service. This included: obtaining or reviewing a separately obtained medical history, reviewing patient records, performing a medically appropriate exam and/or evaluation, counseling or educating the patient/family/caregiver, ordering medications, labs, and/or procedures and documenting such in the medical record.  This does not include time spent on review and interpretation of other tests such as fetal ultrasound or the performance of other procedures such as amniocentesis or CVS.      Douglas A. Milligan, MD  Maternal Fetal Medicine, [facility]     2023

## 2023-12-06 NOTE — LETTER
2023       No Recipients    Patient: Pau Canela   YOB: 1990   Date of Visit: 2023     Dear JOSÉ MIGUEL Saravia:       Thank you for referring Pau Canela to me for evaluation. Below are the relevant portions of my assessment and plan of care.    If you have questions, please do not hesitate to call me. I look forward to following Pau along with you.         Sincerely,        Douglas A. Milligan, MD        CC:   No Recipients    Milligan, Douglas A, MD  23 0953  Sign when Signing Visit  Documentation of the ultrasound findings, images, and interpretations will be available in the patient's Viewpoint report which is located in the imaging tab in chart review.    Maternal/Fetal Medicine Consult Note     Name: Pau Canela    : 1990     MRN: [id number]     Referring Provider: JOSÉ MIGUEL Saravia    Chief Complaint  Hx of cerclage in Prev c/s and cerclage, Hx of GDM, PCOS, P    Subjective     History of Present Illness:  Pau Canela is a 33 y.o.  11w5d who presents today for incompetent cervix    DIALLO: Estimated Date of Delivery: 24     ROS:   As noted in HPI.     Past Medical History:   Diagnosis Date   • History of cervical cerclage 2018   • History of gestational diabetes 2018    GDM / was on oral meds   • Obesity, Class III, BMI 40-49.9 (morbid obesity)    • PCOS (polycystic ovarian syndrome)       Past Surgical History:   Procedure Laterality Date   • CERVICAL CERCLAGE Bilateral 2018    Procedure: CERVICAL CERCLAGE, VAGINAL * 21WKS *;  Surgeon: Douglas A Milligan, MD;  Location: [facility] LABOR DELIVERY;  Service:    •  SECTION  2018   • CHOLECYSTECTOMY        OB History          3    Para   1    Term   1       0    AB   1    Living   1         SAB   1    IAB   0    Ectopic   0    Molar   0    Multiple   0    Live Births   1          Obstetric Comments   Fob #1 - Pregnancy #1 and #2  Fob #2 - Pregnancy #3   Pregnancy #1- GDM, Cerclage  "(rescue) - c/s for breech , oligohydramnios                Objective     Vital Signs  /59   Wt 105 kg (231 lb 9.6 oz)   LMP 09/15/2023   Estimated body mass index is 42.36 kg/m² as calculated from the following:    Height as of 18: 157.5 cm (62\").    Weight as of this encounter: 105 kg (231 lb 9.6 oz).    Physical Exam    Ultrasound Impression:   See Viewpoint    Assessment and Plan     Pau Canela is a 33 y.o.  11w5d who presents today for incompetent cervix    Diagnoses and all orders for this visit:    1. Incompetent cervix (Primary)  Assessment & Plan:  Patient's last pregnancy was complicated by progressive shortening and funneling of her cervix under observation.  Was felt that this represented a incompetent cervix and she had a rescue cerclage placed.  She progressed to the 37 weeks gestation and had a  section for breech presentation.    We counseled the patient regarding the difficulties in being certain about diagnoses of incompetent cervix.  I strongly suspect that this patient has a true incompetent cervix and we discussed management plans.  Basically the 2 management options are serial ultrasounds following cervical length and placing a cerclage if we see progressive shortening and funneling of the cervix again.  The other option is to place a prophylactic cerclage at 12 to 14 weeks gestation.  We discussed the risks of cerclage and the benefits of prophylactic cerclage compared to rescue cerclage.  After careful consideration the patient elects to proceed with a prophylactic cerclage.  We will schedule this in the next couple weeks and we will see the patient back at 18 weeks gestation to perform her anatomy survey.    Orders:  -     [facility]; [facility]    2. Morbid obesity with BMI of 40.0-44.9, adult  -     [facility]; [facility]    3. Previous  delivery affecting pregnancy  -      [facility]; " [facility]    4. Pregnancy, unspecified gestational age  -     [facility]; Future         Follow Up  Return in about 7 weeks (around 2024).    I spent 24 minutes caring for the patient on the day of service. This included: obtaining or reviewing a separately obtained medical history, reviewing patient records, performing a medically appropriate exam and/or evaluation, counseling or educating the patient/family/caregiver, ordering medications, labs, and/or procedures and documenting such in the medical record. This does not include time spent on review and interpretation of other tests such as fetal ultrasound or the performance of other procedures such as amniocentesis or CVS.      Douglas A. Milligan, MD  Maternal Fetal Medicine, [facility] [place]     2023

## 2023-12-06 NOTE — ASSESSMENT & PLAN NOTE
Patient's last pregnancy was complicated by progressive shortening and funneling of her cervix under observation.  Was felt that this represented a incompetent cervix and she had a rescue cerclage placed.  She progressed to the 37 weeks gestation and had a  section for breech presentation.    We counseled the patient regarding the difficulties in being certain about diagnoses of incompetent cervix.  I strongly suspect that this patient has a true incompetent cervix and we discussed management plans.  Basically the 2 management options are serial ultrasounds following cervical length and placing a cerclage if we see progressive shortening and funneling of the cervix again.  The other option is to place a prophylactic cerclage at 12 to 14 weeks gestation.  We discussed the risks of cerclage and the benefits of prophylactic cerclage compared to rescue cerclage.  After careful consideration the patient elects to proceed with a prophylactic cerclage.  We will schedule this in the next couple weeks and we will see the patient back at 18 weeks gestation to perform her anatomy survey.

## 2023-12-06 NOTE — PROGRESS NOTES
No complaints today, Next f/u with [facility] on 12/26/23, NIPT neg per pt report - female   Pt had cerclage and GDM with 1st baby   Was rescue cerclage - at 21 weeks

## 2023-12-13 ENCOUNTER — ANESTHESIA EVENT (OUTPATIENT)
Dept: LABOR AND DELIVERY | Facility: HOSPITAL | Age: 33
End: 2023-12-13
Payer: COMMERCIAL

## 2023-12-13 ENCOUNTER — HOSPITAL ENCOUNTER (OUTPATIENT)
Facility: HOSPITAL | Age: 33
Discharge: HOME | End: 2023-12-13
Attending: OBSTETRICS & GYNECOLOGY | Admitting: OBSTETRICS & GYNECOLOGY
Payer: COMMERCIAL

## 2023-12-13 ENCOUNTER — ANESTHESIA (OUTPATIENT)
Dept: LABOR AND DELIVERY | Facility: HOSPITAL | Age: 33
End: 2023-12-13
Payer: COMMERCIAL

## 2023-12-13 VITALS
HEIGHT: 61 IN | TEMPERATURE: 98.1 F | SYSTOLIC BLOOD PRESSURE: 113 MMHG | WEIGHT: 230 LBS | HEART RATE: 84 BPM | RESPIRATION RATE: 16 BRPM | OXYGEN SATURATION: 99 % | BODY MASS INDEX: 43.43 KG/M2 | DIASTOLIC BLOOD PRESSURE: 50 MMHG

## 2023-12-13 DIAGNOSIS — Z34.90 PREGNANCY, UNSPECIFIED GESTATIONAL AGE: Primary | ICD-10-CM

## 2023-12-13 DIAGNOSIS — N88.3 INCOMPETENT CERVIX: ICD-10-CM

## 2023-12-13 DIAGNOSIS — E66.01 MORBID OBESITY WITH BMI OF 40.0-44.9, ADULT: ICD-10-CM

## 2023-12-13 DIAGNOSIS — O26.879 SHORT CERVIX AFFECTING PREGNANCY: ICD-10-CM

## 2023-12-13 PROCEDURE — 25010000002 CEFAZOLIN PER 500 MG: Performed by: OBSTETRICS & GYNECOLOGY

## 2023-12-13 PROCEDURE — C1755 CATHETER, INTRASPINAL: HCPCS

## 2023-12-13 PROCEDURE — 59320 REVISION OF CERVIX: CPT | Performed by: OBSTETRICS & GYNECOLOGY

## 2023-12-13 PROCEDURE — 25810000003 LACTATED RINGERS PER 1000 ML: Performed by: OBSTETRICS & GYNECOLOGY

## 2023-12-13 PROCEDURE — 25010000002 BUPIVACAINE IN DEXTROSE 0.75-8.25 % SOLUTION: Performed by: NURSE ANESTHETIST, CERTIFIED REGISTERED

## 2023-12-13 PROCEDURE — 25010000002 FENTANYL CITRATE (PF) 50 MCG/ML SOLUTION: Performed by: NURSE ANESTHETIST, CERTIFIED REGISTERED

## 2023-12-13 RX ORDER — OXYCODONE HYDROCHLORIDE AND ACETAMINOPHEN 5; 325 MG/1; MG/1
1 TABLET ORAL EVERY 4 HOURS PRN
Status: DISCONTINUED | OUTPATIENT
Start: 2023-12-13 | End: 2023-12-13 | Stop reason: HOSPADM

## 2023-12-13 RX ORDER — SODIUM CHLORIDE, SODIUM LACTATE, POTASSIUM CHLORIDE, CALCIUM CHLORIDE 600; 310; 30; 20 MG/100ML; MG/100ML; MG/100ML; MG/100ML
125 INJECTION, SOLUTION INTRAVENOUS CONTINUOUS
Status: DISCONTINUED | OUTPATIENT
Start: 2023-12-13 | End: 2023-12-13 | Stop reason: HOSPADM

## 2023-12-13 RX ORDER — OXYCODONE HYDROCHLORIDE AND ACETAMINOPHEN 5; 325 MG/1; MG/1
1 TABLET ORAL EVERY 4 HOURS PRN
Status: DISCONTINUED | OUTPATIENT
Start: 2023-12-13 | End: 2023-12-13 | Stop reason: SDUPTHER

## 2023-12-13 RX ORDER — IBUPROFEN 600 MG/1
600 TABLET ORAL ONCE
Status: COMPLETED | OUTPATIENT
Start: 2023-12-13 | End: 2023-12-13

## 2023-12-13 RX ORDER — SODIUM CHLORIDE 0.9 % (FLUSH) 0.9 %
10 SYRINGE (ML) INJECTION AS NEEDED
Status: DISCONTINUED | OUTPATIENT
Start: 2023-12-13 | End: 2023-12-13 | Stop reason: HOSPADM

## 2023-12-13 RX ORDER — FENTANYL CITRATE 50 UG/ML
INJECTION, SOLUTION INTRAMUSCULAR; INTRAVENOUS AS NEEDED
Status: DISCONTINUED | OUTPATIENT
Start: 2023-12-13 | End: 2023-12-13 | Stop reason: SURG

## 2023-12-13 RX ORDER — BUPIVACAINE HYDROCHLORIDE 7.5 MG/ML
INJECTION, SOLUTION INTRASPINAL AS NEEDED
Status: DISCONTINUED | OUTPATIENT
Start: 2023-12-13 | End: 2023-12-13 | Stop reason: SURG

## 2023-12-13 RX ORDER — ONDANSETRON 2 MG/ML
4 INJECTION INTRAMUSCULAR; INTRAVENOUS ONCE AS NEEDED
Status: DISCONTINUED | OUTPATIENT
Start: 2023-12-13 | End: 2023-12-13 | Stop reason: HOSPADM

## 2023-12-13 RX ORDER — SODIUM CHLORIDE 9 MG/ML
40 INJECTION, SOLUTION INTRAVENOUS AS NEEDED
Status: DISCONTINUED | OUTPATIENT
Start: 2023-12-13 | End: 2023-12-13 | Stop reason: HOSPADM

## 2023-12-13 RX ORDER — IBUPROFEN 600 MG/1
600 TABLET ORAL ONCE
Status: DISCONTINUED | OUTPATIENT
Start: 2023-12-13 | End: 2023-12-13 | Stop reason: HOSPADM

## 2023-12-13 RX ORDER — ACETAMINOPHEN AND CODEINE PHOSPHATE 300; 30 MG/1; MG/1
1-2 TABLET ORAL EVERY 6 HOURS PRN
Qty: 20 TABLET | Refills: 0 | Status: SHIPPED | OUTPATIENT
Start: 2023-12-13

## 2023-12-13 RX ORDER — EPHEDRINE SULFATE 5 MG/ML
INJECTION INTRAVENOUS AS NEEDED
Status: DISCONTINUED | OUTPATIENT
Start: 2023-12-13 | End: 2023-12-13 | Stop reason: SURG

## 2023-12-13 RX ORDER — NALOXONE HCL 0.4 MG/ML
0.4 VIAL (ML) INJECTION
Status: DISCONTINUED | OUTPATIENT
Start: 2023-12-13 | End: 2023-12-13 | Stop reason: HOSPADM

## 2023-12-13 RX ORDER — SODIUM CHLORIDE 0.9 % (FLUSH) 0.9 %
10 SYRINGE (ML) INJECTION EVERY 12 HOURS SCHEDULED
Status: DISCONTINUED | OUTPATIENT
Start: 2023-12-13 | End: 2023-12-13 | Stop reason: HOSPADM

## 2023-12-13 RX ADMIN — EPHEDRINE SULFATE 5 MG: 5 INJECTION INTRAVENOUS at 11:02

## 2023-12-13 RX ADMIN — SODIUM CHLORIDE, POTASSIUM CHLORIDE, SODIUM LACTATE AND CALCIUM CHLORIDE 1500 ML/HR: 600; 310; 30; 20 INJECTION, SOLUTION INTRAVENOUS at 09:35

## 2023-12-13 RX ADMIN — EPHEDRINE SULFATE 5 MG: 5 INJECTION INTRAVENOUS at 11:24

## 2023-12-13 RX ADMIN — IBUPROFEN 600 MG: 600 TABLET ORAL at 10:23

## 2023-12-13 RX ADMIN — SODIUM CHLORIDE, POTASSIUM CHLORIDE, SODIUM LACTATE AND CALCIUM CHLORIDE 125 ML/HR: 600; 310; 30; 20 INJECTION, SOLUTION INTRAVENOUS at 10:24

## 2023-12-13 RX ADMIN — SODIUM CHLORIDE 2000 MG: 900 INJECTION INTRAVENOUS at 10:23

## 2023-12-13 RX ADMIN — FENTANYL CITRATE 15 MCG: 50 INJECTION, SOLUTION INTRAMUSCULAR; INTRAVENOUS at 10:58

## 2023-12-13 RX ADMIN — BUPIVACAINE HYDROCHLORIDE IN DEXTROSE 1.4 ML: 7.5 INJECTION, SOLUTION SUBARACHNOID at 10:58

## 2023-12-13 RX ADMIN — OXYCODONE HYDROCHLORIDE AND ACETAMINOPHEN 1 TABLET: 5; 325 TABLET ORAL at 13:50

## 2023-12-13 NOTE — ANESTHESIA PROCEDURE NOTES
Spinal Block      Patient reassessed immediately prior to procedure    Indication:procedure for pain  Performed By  CRNA/CAA: Rena Savage CRNA  Preanesthetic Checklist  Completed: patient identified, IV checked, risks and benefits discussed, surgical consent, monitors and equipment checked, pre-op evaluation and timeout performed  Spinal Block Prep:  Patient Position:sitting  Sterile Tech:cap, gloves, mask and sterile barriers  Prep:Betadine  Patient Monitoring:blood pressure monitoring, continuous pulse oximetry and EKG    Spinal Block Procedure  Approach:midline  Guidance:palpation technique  Location:L4-L5  Needle Type:Gabriela  Needle Gauge:25 G  Placement of Spinal needle event:cerebrospinal fluid aspirated  Paresthesia: no  Fluid Appearance:clear     Post Assessment  Patient Tolerance:patient tolerated the procedure well with no apparent complications  Complications no

## 2023-12-13 NOTE — OP NOTE
[facility]  Cerclage Operative Note    CC;  Incompetent cervix    Pre-Operative Dx:   1.  IUP at 12 weeks   2. Incompetent cervix      Postoperative dx:    1.  Same     Procedure: Procedure(s):  CERVICAL CERCLAGE, VAGINAL   Surgeon: Milligan   Assistant:    Anesthesia: SAB   EBL:   50  mls.                 Antibiotics: cefazolin (Ancef)         Procedure Details:   Indication: Patient had previous pregnancy complicated by an incompetent cervix with the placement of an emergency cerclage.  She has been counseled regarding her options and elected to proceed with prophylactic cerclage this pregnancy.    Procedure: Patient was brought into the operating room placed on the operating table had a subarachnoid block placed by the anesthesia service.  She was then prepped and draped in usual and the usual sterile fashion.  After ascertaining adequate anesthesia the weighted speculum was placed in posterior vagina and the anterior lip of the cervix was grasped with a ring forcep.  A 5 mm Mersilene band was then placed in pursestring fashion about the entire cervix as high as possible.  There was's approximately 1-1/2 cm of cervix distal to the cerclage.  There appeared to be good purchase around the entire cervix and the cerclage was tied down over a loop of 0 Prolene to aid in removal of the cerclage.  There appeared to be good approximation of the endocervical canal.  Procedure was terminated at this point and the instruments were removed from the vagina.  The bladder was emptied of approximately 150 mL of clear urine.  Patient was taken down out of high lithotomy position transferred to hospital bed and transported recovery room awake and in stable condition.  There were no complications.  Blood loss was 50 mL.   Drains:     Complications:   None      Disposition:   Patient to  awake and stable.  Probable D/C this pm. Follow up appointment already scheduled.          Douglas A. Milligan, MD  12/13/2023  11:22 EST

## 2023-12-13 NOTE — H&P
"[facility]  Obstetric History and Physical  Referring Provider: Elaine holland    No chief complaint on file.      Subjective     Patient is a 33 y.o. female  currently at 12w5d, who presents with previous pregnancy complicated by an incompetent cervix.  Last pregnancy was complicated by shortening and funneling of patient's cervix under observation.  She had emergency cerclage placed at 20 weeks gestation and progressed to term with a cerclage.  Today she had a previous ultrasound of this pregnancy demonstrating a normal intrauterine pregnancy with normal cervical length.  She was counseled regarding the options including prophylactic cerclage and close observation and the patient elects to proceed with prophylactic cerclage.    Her prenatal care is complicated by  cervical incompetence.  Her previous obstetric/gynecological history is noted for is non-contributory.        Prenatal Information:   Maternal Prenatal Labs  Blood Type No results found for: \"ABO\"   Rh Status No results found for: \"RH\"   Antibody Screen No results found for: \"ABSCRN\"   Gonnorhea No results found for: \"GCCX\"   Chlamydia No results found for: \"CLAMYDCU\"   RPR No results found for: \"RPR\"       Rubella No results found for: \"RUBELLAIGGIN\"   Hepatitis B Surface Antigen No results found for: \"HEPBSAG\"   HIV-1 Antibody No results found for: \"LABHIV1\"   Hepatitis C Antibody No results found for: \"HEPCAB\"   Rapid Urin Drug Screen No results found for: \"AMPMETHU\", \"BARBITSCNUR\", \"LABBENZSCN\", \"LABMETHSCN\", \"LABOPIASCN\", \"THCURSCR\", \"COCAINEUR\", \"AMPHETSCREEN\", \"PROPOXSCN\", \"BUPRENORSCNU\", \"METAMPSCNUR\", \"OXYCODONESCN\", \"TRICYCLICSCN\"   Group B Strep Culture No results found for: \"GBSANTIGEN\"           External Prenatal Results       Pregnancy Outside Results - Transcribed From Office Records - See Scanned Records For Details       Test Value Date Time    ABO  A  18 1220    Rh  Positive  18 1220    Antibody Screen  Negative  " 18 1220    Varicella IgG       Rubella       Hgb  12.7 g/dL 18 1220    Hct  37.6 % 18 1220    Glucose Fasting GTT       Glucose Tolerance Test 1 hour       Glucose Tolerance Test 3 hour       Gonorrhea (discrete)       Chlamydia (discrete)       RPR       VDRL       Syphilis Antibody       HBsAg       Herpes Simplex Virus PCR       Herpes Simplex VIrus Culture       HIV       Hep C RNA Quant PCR       Hep C Antibody       AFP       Group B Strep       GBS Susceptibility to Clindamycin       GBS Susceptibility to Erythromycin       Fetal Fibronectin       Genetic Testing, Maternal Blood                 Drug Screening       Test Value Date Time    Urine Drug Screen       Amphetamine Screen       Barbiturate Screen       Benzodiazepine Screen       Methadone Screen       Phencyclidine Screen       Opiates Screen       THC Screen       Cocaine Screen       Propoxyphene Screen       Buprenorphine Screen       Methamphetamine Screen       Oxycodone Screen       Tricyclic Antidepressants Screen                 Legend    ^: Historical                              Past OB History:       OB History    Para Term  AB Living   3 1 1 0 1 1   SAB IAB Ectopic Molar Multiple Live Births   1 0 0 0 0 1      # Outcome Date GA Lbr Diaz/2nd Weight Sex Delivery Anes PTL Lv   3 Current            2 SAB 19 5w0d    SAB      1 Term 18 37w3d  2722 g (6 lb) M CS-LTranv Spinal  ROBERTO      Complications: Gestational diabetes, Breech presentation      Obstetric Comments   Fob #1 - Pregnancy #1 and #2   Fob #2 - Pregnancy #3    Pregnancy #1- GDM, Cerclage (rescue) - c/s for breech , oligohydramnios        Past Medical History: Past Medical History:   Diagnosis Date    History of cervical cerclage     History of gestational diabetes 2018    GDM / was on oral meds    Obesity, Class III, BMI 40-49.9 (morbid obesity)     PCOS (polycystic ovarian syndrome)       Past Surgical History Past Surgical History:    Procedure Laterality Date    CERVICAL CERCLAGE Bilateral 2018    Procedure: CERVICAL CERCLAGE, VAGINAL * 21WKS *;  Surgeon: Douglas A Milligan, MD;  Location: [facility] LABOR DELIVERY;  Service:      SECTION      CHOLECYSTECTOMY        Family History: History reviewed. No pertinent family history.   Social History:  reports that she quit smoking about a year ago. Her smoking use included cigarettes. She has a 3.75 pack-year smoking history. She has never used smokeless tobacco.   reports no history of alcohol use.   reports no history of drug use.        General ROS: Pertinent items are noted in HPI, all other systems reviewed and negative    Objective       Vital Signs Range for the last 24 hours  Temperature: Temp:  [98.3 °F (36.8 °C)] 98.3 °F (36.8 °C)   Temp Source: Temp src: Oral   BP: BP: (106)/(56) 106/56   Pulse:     Respirations: Resp:  [16] 16               Weight: Weight:  [104 kg (230 lb)] 104 kg (230 lb)     Physical Examination: General appearance - alert, well appearing, and in no distress  Mental status - alert, oriented to person, place, and time  Eyes - sclera anicteric, left eye normal, right eye normal  Ears - right ear normal, left ear normal  Mouth - mucous membranes moist, pharynx normal without lesions  Neck - supple, no significant adenopathy  Chest - no tachypnea, retractions or cyanosis  Heart - normal rate and regular rhythm  Abdomen - soft, nontender, nondistended, no masses or organomegaly  Back exam - full range of motion, no tenderness, palpable spasm or pain on motion  Neurological - alert, oriented, normal speech, no focal findings or movement disorder noted  Musculoskeletal - no joint tenderness, deformity or swelling  Extremities - peripheral pulses normal, no pedal edema, no clubbing or cyanosis  Skin - normal coloration and turgor, no rashes, no suspicious skin lesions noted    Presentation: varible   Cervix: Exam by:     Dilation:     Effacement:    "  Station:         Fetal Heart Rate Assessment   Method:     Beats/min:     Baseline:     Varibility:     Accels:     Decels:     Tracing Category:       Uterine Assessment   Method:     Frequency (min):     Ctx Count in 10 min:     Duration:     Intensity:     Intensity by IUPC:     Resting Tone:     Resting Tone by IUPC:     [facility]cy Units:       Laboratory Results: N\A  Radiology Review: see viewpoint by me, reviewed  Other Studies: n/a    Assessment & Plan       Incompetent cervix        Assessment:  1.  Intrauterine pregnancy at 12w5d weeks gestation with reassuring fetal status.    2.  Incompetent cervix  3.  Obstetrical history significant for is non-contributory.  4.  GBS status: No results found for: \"GBSANTIGEN\"    Plan:  1. [place] cerclage  2. Plan of care has been reviewed with patient.  3.  Risks, benefits of treatment plan have been discussed.  4.  All questions have been answered.      Douglas A. Milligan, MD  12/13/2023  09:51 EST  "

## 2023-12-13 NOTE — ANESTHESIA POSTPROCEDURE EVALUATION
Patient: Pau Canela    Procedure Summary       Date: 12/13/23 Room / Location: [facility] LABOR DELIVERY 1 / [facility] LABOR DELIVERY    Anesthesia Start: 1048 Anesthesia Stop: 1125    Procedure: CERVICAL CERCLAGE, VAGINAL (Vagina) Diagnosis:     Surgeons: Milligan, Douglas A, MD Provider: Toño Hatch DO    Anesthesia Type: spinal ASA Status: 3            Anesthesia Type: spinal    Vitals  Vitals Value Taken Time   /66 12/13/23 1123   Temp 98.1 °F (36.7 °C) 12/13/23 1123   Pulse 76 12/13/23 1136   Resp 16 12/13/23 1123   SpO2 100 % 12/13/23 1136   Vitals shown include unfiled device data.          Post Anesthesia Care and Evaluation    Patient location during evaluation: bedside  Patient participation: complete - patient participated  Level of consciousness: awake and alert  Pain score: 0  Pain management: adequate    Airway patency: patent  Anesthetic complications: No anesthetic complications    Cardiovascular status: acceptable  Respiratory status: acceptable  Hydration status: acceptable

## 2023-12-13 NOTE — ANESTHESIA PREPROCEDURE EVALUATION
Anesthesia Evaluation     Patient summary reviewed and Nursing notes reviewed   NPO Solid Status: > 8 hours             Airway   Dental      Pulmonary - negative pulmonary ROS   Cardiovascular - negative cardio ROS        Neuro/Psych- negative ROS  GI/Hepatic/Renal/Endo - negative ROS   (+) obesity, morbid obesity    Musculoskeletal (-) negative ROS    Abdominal    Substance History - negative use     OB/GYN negative ob/gyn ROS   (+) Pregnant    Comment: Incompetent cervix      Other                    Anesthesia Plan    ASA 3     spinal       Anesthetic plan, risks, benefits, and alternatives have been provided, discussed and informed consent has been obtained with: patient.    CODE STATUS:

## 2024-01-24 ENCOUNTER — HOSPITAL ENCOUNTER (OUTPATIENT)
Dept: WOMENS IMAGING | Facility: HOSPITAL | Age: 34
Discharge: HOME | End: 2024-01-24
Admitting: OBSTETRICS & GYNECOLOGY
Payer: COMMERCIAL

## 2024-01-24 ENCOUNTER — OFFICE VISIT (OUTPATIENT)
Dept: OBSTETRICS AND GYNECOLOGY | Facility: HOSPITAL | Age: 34
End: 2024-01-24
Payer: COMMERCIAL

## 2024-01-24 VITALS — SYSTOLIC BLOOD PRESSURE: 117 MMHG | BODY MASS INDEX: 43.95 KG/M2 | DIASTOLIC BLOOD PRESSURE: 62 MMHG | WEIGHT: 232.6 LBS

## 2024-01-24 DIAGNOSIS — O34.219 PREVIOUS CESAREAN DELIVERY AFFECTING PREGNANCY: ICD-10-CM

## 2024-01-24 DIAGNOSIS — E66.01 MORBID OBESITY WITH BMI OF 40.0-44.9, ADULT: ICD-10-CM

## 2024-01-24 DIAGNOSIS — N88.3 INCOMPETENT CERVIX: ICD-10-CM

## 2024-01-24 DIAGNOSIS — N88.3 INCOMPETENT CERVIX: Primary | ICD-10-CM

## 2024-01-24 DIAGNOSIS — Z34.90 PREGNANCY, UNSPECIFIED GESTATIONAL AGE: ICD-10-CM

## 2024-01-24 PROCEDURE — 76811 OB US DETAILED SNGL FETUS: CPT

## 2024-01-24 NOTE — PROGRESS NOTES
"Documentation of the ultrasound findings, images, and interpretations will be available in the patient's Viewpoint report which is located in the imaging tab in chart review.    Maternal/Fetal Medicine Follow Up  Note     Name: Pau Canela    : 1990     MRN: [id number]     Referring Provider: JOSÉ MIGUEL Saravia    Chief Complaint  Cerclage, Hx of c section     Subjective     History of Present Illness:  Pau Canela is a 33 y.o.  18w5d who presents today for incompetent cervix    DIALLO: Estimated Date of Delivery: 24     ROS:   As noted in HPI.     Objective     Vital Signs  /62   Wt 106 kg (232 lb 9.6 oz)   LMP 09/15/2023   Estimated body mass index is 43.95 kg/m² as calculated from the following:    Height as of 23: 154.9 cm (61\").    Weight as of this encounter: 106 kg (232 lb 9.6 oz).    Physical Exam    Ultrasound Impression:   See Viewpoint    Assessment and Plan     Pau Canela is a 33 y.o.  18w5d who presents today for incompetent cervix    Diagnoses and all orders for this visit:    1. Incompetent cervix (Primary)  Assessment & Plan:  Patient returns today for follow-up of having had a cerclage placed for history of incompetent cervix.  Patient reports slight dampness in her underwear in the morning but no continuous leaking and no big gush of fluid.  Ultrasound today demonstrates a normally grown fetus with no abnormality seen.  Amniotic fluid volume appears normal and fetal head is not tightly down against the cervix.  I do not believe there is any rupture membranes or leaking of amniotic fluid.  Cervix appears normal in length and closed well above the states.    With a cervix closed well above the states and do not believe the patient needs to go to bed rest at this time.  We will rescan again 1 more time in 4 weeks to assess cervical length.  If that remains normal then I would not place the patient at bedrest.  If the cervix finals down to the states that 4 " weeks then we would consider placing the patient at bedrest for the remainder of pregnancy.      2. Morbid obesity with BMI of 40.0-44.9, adult    3. Pregnancy, unspecified gestational age         Follow Up  Return in about 4 weeks (around 2024).    I spent 20 minutes caring for the patient on the day of service. This included: obtaining or reviewing a separately obtained medical history, reviewing patient records, performing a medically appropriate exam and/or evaluation, counseling or educating the patient/family/caregiver, ordering medications, labs, and/or procedures and documenting such in the medical record. This does not include time spent on review and interpretation of other tests such as fetal ultrasound or the performance of other procedures such as amniocentesis or CVS.      Douglas A. Milligan, MD  Maternal Fetal Medicine, [facility]     2024

## 2024-01-24 NOTE — ASSESSMENT & PLAN NOTE
Patient returns today for follow-up of having had a cerclage placed for history of incompetent cervix.  Patient reports slight dampness in her underwear in the morning but no continuous leaking and no big gush of fluid.  Ultrasound today demonstrates a normally grown fetus with no abnormality seen.  Amniotic fluid volume appears normal and fetal head is not tightly down against the cervix.  I do not believe there is any rupture membranes or leaking of amniotic fluid.  Cervix appears normal in length and closed well above the states.    With a cervix closed well above the states and do not believe the patient needs to go to bed rest at this time.  We will rescan again 1 more time in 4 weeks to assess cervical length.  If that remains normal then I would not place the patient at bedrest.  If the cervix finals down to the states that 4 weeks then we would consider placing the patient at bedrest for the remainder of pregnancy.

## 2024-01-24 NOTE — PROGRESS NOTES
Pt reports during us that she did have NIPT done in [place] and It was normal.  Will try to obtain copy of results for chart.

## 2024-01-24 NOTE — PROGRESS NOTES
"Pt denies cramping , contractions.  Reports vaginal discharge with no color at night, not wearing pad today, Pt reports her underwear are slightly wet in the mornings.    Pt states \" I am just worried about leaking or what it could be\".    Next f/u with [place] in 2 weeks/February  NIPT - ordered but not collected   "

## 2024-01-24 NOTE — LETTER
"2024     JOSÉ MIGUEL Saravia  [address] [place] [id number]    Patient: Pau Canela   YOB: 1990   Date of Visit: 2024     Dear JOSÉ MIGUEL Saravia:       Thank you for referring Pau Canela to me for evaluation. Below are the relevant portions of my assessment and plan of care.    If you have questions, please do not hesitate to call me. I look forward to following Pau along with you.         Sincerely,        Douglas A. Milligan, MD        CC: No Recipients    Milligan, Douglas A, MD  24 0947  Sign when Signing Visit  Documentation of the ultrasound findings, images, and interpretations will be available in the patient's Viewpoint report which is located in the imaging tab in chart review.    Maternal/Fetal Medicine Follow Up  Note     Name: Pau Canela    : 1990     MRN: [id number]     Referring Provider: JOSÉ MIGUEL Saravia    Chief Complaint  Cerclage, Hx of c section     Subjective     History of Present Illness:  Pau Canela is a 33 y.o.  18w5d who presents today for incompetent cervix    DIALLO: Estimated Date of Delivery: 24     ROS:   As noted in HPI.     Objective     Vital Signs  /62   Wt 106 kg (232 lb 9.6 oz)   LMP 09/15/2023   Estimated body mass index is 43.95 kg/m² as calculated from the following:    Height as of 23: 154.9 cm (61\").    Weight as of this encounter: 106 kg (232 lb 9.6 oz).    Physical Exam    Ultrasound Impression:   See Viewpoint    Assessment and Plan     Pau Canela is a 33 y.o.  18w5d who presents today for incompetent cervix    Diagnoses and all orders for this visit:    1. Incompetent cervix (Primary)  Assessment & Plan:  Patient returns today for follow-up of having had a cerclage placed for history of incompetent cervix.  Patient reports slight dampness in her underwear in the morning but no continuous leaking and no big gush of fluid.  Ultrasound today demonstrates a normally grown fetus with no " abnormality seen.  Amniotic fluid volume appears normal and fetal head is not tightly down against the cervix.  I do not believe there is any rupture membranes or leaking of amniotic fluid.  Cervix appears normal in length and closed well above the states.    With a cervix closed well above the states and do not believe the patient needs to go to bed rest at this time.  We will rescan again 1 more time in 4 weeks to assess cervical length.  If that remains normal then I would not place the patient at bedrest.  If the cervix finals down to the states that 4 weeks then we would consider placing the patient at bedrest for the remainder of pregnancy.      2. Morbid obesity with BMI of 40.0-44.9, adult    3. Pregnancy, unspecified gestational age         Follow Up  Return in about 4 weeks (around 2024).    I spent 20 minutes caring for the patient on the day of service. This included: obtaining or reviewing a separately obtained medical history, reviewing patient records, performing a medically appropriate exam and/or evaluation, counseling or educating the patient/family/caregiver, ordering medications, labs, and/or procedures and documenting such in the medical record. This does not include time spent on review and interpretation of other tests such as fetal ultrasound or the performance of other procedures such as amniocentesis or CVS.      Douglas A. Milligan, MD  Maternal Fetal Medicine, [facility]     2024

## 2024-02-21 ENCOUNTER — HOSPITAL ENCOUNTER (OUTPATIENT)
Dept: WOMENS IMAGING | Facility: HOSPITAL | Age: 34
Discharge: HOME | End: 2024-02-21
Admitting: OBSTETRICS & GYNECOLOGY
Payer: COMMERCIAL

## 2024-02-21 ENCOUNTER — OFFICE VISIT (OUTPATIENT)
Dept: OBSTETRICS AND GYNECOLOGY | Facility: HOSPITAL | Age: 34
End: 2024-02-21
Payer: COMMERCIAL

## 2024-02-21 VITALS — DIASTOLIC BLOOD PRESSURE: 63 MMHG | WEIGHT: 235 LBS | BODY MASS INDEX: 44.4 KG/M2 | SYSTOLIC BLOOD PRESSURE: 116 MMHG

## 2024-02-21 DIAGNOSIS — N88.3 INCOMPETENT CERVIX: ICD-10-CM

## 2024-02-21 DIAGNOSIS — E66.01 MORBID OBESITY WITH BMI OF 40.0-44.9, ADULT: ICD-10-CM

## 2024-02-21 DIAGNOSIS — O35.BXX0 ECHOGENIC FOCUS OF HEART OF FETUS AFFECTING ANTEPARTUM CARE OF MOTHER, SINGLE OR UNSPECIFIED FETUS: ICD-10-CM

## 2024-02-21 DIAGNOSIS — Z34.90 PREGNANCY, UNSPECIFIED GESTATIONAL AGE: ICD-10-CM

## 2024-02-21 DIAGNOSIS — N88.3 INCOMPETENT CERVIX: Primary | ICD-10-CM

## 2024-02-21 DIAGNOSIS — O34.219 PREVIOUS CESAREAN DELIVERY AFFECTING PREGNANCY: ICD-10-CM

## 2024-02-21 PROCEDURE — 76816 OB US FOLLOW-UP PER FETUS: CPT

## 2024-02-21 NOTE — ASSESSMENT & PLAN NOTE
An EIF is defined as a small (<6 mm) echogenic area in either cardiac ventricle that is as bright as the surrounding bone and visualized in at least 2 separate planes.  EIFs may appear in either cardiac ventricle, although left-sided EIFs are more common and are thought to represent microcalcifications of papillary muscles.  Since the first descriptions of EIFs as a soft marker for trisomy 21, a subsequent large body of literature has demonstrated varying positive likelihood ratios (LR) for trisomy 21, depending on the population studied (low-risk vs high-risk) and whether the EIF was isolated or in combination with other soft markers.  Overall, isolated EIFs have a positive LR ranging between 1.4 and 1.8 with a lower confidence interval extending to or lower than 1, suggesting a minimal risk.      I discussed how an echogenic intracardiac focus is not a true birth defect as it may be seen in ~ 1/25 normal  fetuses and up to ~ 1/10  fetuses. These women should be offered noninvasive screening with an MSAFP quad screen at the appropriate gestational age or cell-free fetal DNA evaluation.  For pregnant people with a negative serum or cfDNA screening results and an isolated EIF, ACOG and SMFM currently recommend no further evaluation, as this finding is a normal variant of no clinical importance with no indication for fetal echocardiography, follow-up ultrasound imaging or  evaluation ([id number]).

## 2024-02-21 NOTE — ASSESSMENT & PLAN NOTE
Patient returns today for follow-up status post cerclage for incompetent cervix.  Patient reports no complications and no regular contractions.    Ultrasound today demonstrates a normally grown fetus with no abnormality seen.  A small EIF was seen in the left ventricle fetal heart appears otherwise structurally normal.  Cervix was 2.7 cm with a small funnel but closed well above the stitch.    With the patient's cervix remaining closed well above the states at this point I do not believe there is required for her to be at bedrest patient was counseled extensively regarding  labor and will contact her provider immediately if she notices any  labor.  We have not scheduled the patient back with us as she plans to have her cerclage removed after her  while she still anesthetized.  We would be happy to see the patient anytime if needed.

## 2024-02-21 NOTE — LETTER
"2024     JOSÉ MIGUEL Saravia  [address] [facility] [address]    Patient: Pau Canela   YOB: 1990   Date of Visit: 2024     Dear JOSÉ MIGUEL Saravia:       Thank you for referring Pau Canela to me for evaluation. Below are the relevant portions of my assessment and plan of care.    If you have questions, please do not hesitate to call me. I look forward to following Pau along with you.         Sincerely,        Douglas A. Milligan, MD        CC: No Recipients    Milligan, Douglas A, MD  24 0924  Sign when Signing Visit  Documentation of the ultrasound findings, images, and interpretations will be available in the patient's Viewpoint report which is located in the imaging tab in chart review.    Maternal/Fetal Medicine Follow Up  Note     Name: Pau Canela    : 1990     MRN: [id number]     Referring Provider: JOSÉ MIGUEL Saravia    Chief Complaint  EIF, s/p cerclage, hx C/S x1, PCOS    Subjective     History of Present Illness:  Pau Canela is a 33 y.o.  22w5d who presents today for incompetent cervix    DIALLO: Estimated Date of Delivery: 24     ROS:   As noted in HPI.     Objective     Vital Signs  /63   Wt 107 kg (235 lb)   LMP 09/15/2023   Estimated body mass index is 44.4 kg/m² as calculated from the following:    Height as of 23: 154.9 cm (61\").    Weight as of this encounter: 107 kg (235 lb).    Physical Exam    Ultrasound Impression:   See Viewpoint    Assessment and Plan     Pau Canela is a 33 y.o.  22w5d who presents today for incompetent cervix    Diagnoses and all orders for this visit:    1. Incompetent cervix (Primary)  Assessment & Plan:  Patient returns today for follow-up status post cerclage for incompetent cervix.  Patient reports no complications and no regular contractions.    Ultrasound today demonstrates a normally grown fetus with no abnormality seen.  A small EIF was seen in the left ventricle fetal heart appears " otherwise structurally normal.  Cervix was 2.7 cm with a small funnel but closed well above the stitch.    With the patient's cervix remaining closed well above the states at this point I do not believe there is required for her to be at bedrest patient was counseled extensively regarding  labor and will contact her provider immediately if she notices any  labor.  We have not scheduled the patient back with us as she plans to have her cerclage removed after her  while she still anesthetized.  We would be happy to see the patient anytime if needed.      2. Morbid obesity with BMI of 40.0-44.9, adult    3. Echogenic focus of heart of fetus affecting antepartum care of mother, single or unspecified fetus  Assessment & Plan:  An EIF is defined as a small (<6 mm) echogenic area in either cardiac ventricle that is as bright as the surrounding bone and visualized in at least 2 separate planes.  EIFs may appear in either cardiac ventricle, although left-sided EIFs are more common and are thought to represent microcalcifications of papillary muscles.  Since the first descriptions of EIFs as a soft marker for trisomy 21, a subsequent large body of literature has demonstrated varying positive likelihood ratios (LR) for trisomy 21, depending on the population studied (low-risk vs high-risk) and whether the EIF was isolated or in combination with other soft markers.  Overall, isolated EIFs have a positive LR ranging between 1.4 and 1.8 with a lower confidence interval extending to or lower than 1, suggesting a minimal risk.      I discussed how an echogenic intracardiac focus is not a true birth defect as it may be seen in ~ 1/25 normal  fetuses and up to ~ 1/10  fetuses. These women should be offered noninvasive screening with an MSAFP quad screen at the appropriate gestational age or cell-free fetal DNA evaluation.  For pregnant people with a negative serum or cfDNA screening results and an  isolated EIF, ACOG and SMFM currently recommend no further evaluation, as this finding is a normal variant of no clinical importance with no indication for fetal echocardiography, follow-up ultrasound imaging or  evaluation ([id number]).      4. Previous  delivery affecting pregnancy         Follow Up  No follow-ups on file.    I spent 20 minutes caring for the patient on the day of service. This included: obtaining or reviewing a separately obtained medical history, reviewing patient records, performing a medically appropriate exam and/or evaluation, counseling or educating the patient/family/caregiver, ordering medications, labs, and/or procedures and documenting such in the medical record. This does not include time spent on review and interpretation of other tests such as fetal ultrasound or the performance of other procedures such as amniocentesis or CVS.      Douglas A. Milligan, MD  Maternal Fetal Medicine, [facility] [place]     2024

## 2024-02-21 NOTE — PROGRESS NOTES
"Documentation of the ultrasound findings, images, and interpretations will be available in the patient's Viewpoint report which is located in the imaging tab in chart review.    Maternal/Fetal Medicine Follow Up  Note     Name: Pau Canela    : 1990     MRN: [id number]     Referring Provider: JOSÉ MIGUEL Saravia    Chief Complaint  EIF, s/p cerclage, hx C/S x1, PCOS    Subjective     History of Present Illness:  Pau Canela is a 33 y.o.  22w5d who presents today for incompetent cervix    DIALLO: Estimated Date of Delivery: 24     ROS:   As noted in HPI.     Objective     Vital Signs  /63   Wt 107 kg (235 lb)   LMP 09/15/2023   Estimated body mass index is 44.4 kg/m² as calculated from the following:    Height as of 23: 154.9 cm (61\").    Weight as of this encounter: 107 kg (235 lb).    Physical Exam    Ultrasound Impression:   See Viewpoint    Assessment and Plan     Pau Canela is a 33 y.o.  22w5d who presents today for incompetent cervix    Diagnoses and all orders for this visit:    1. Incompetent cervix (Primary)  Assessment & Plan:  Patient returns today for follow-up status post cerclage for incompetent cervix.  Patient reports no complications and no regular contractions.    Ultrasound today demonstrates a normally grown fetus with no abnormality seen.  A small EIF was seen in the left ventricle fetal heart appears otherwise structurally normal.  Cervix was 2.7 cm with a small funnel but closed well above the stitch.    With the patient's cervix remaining closed well above the states at this point I do not believe there is required for her to be at bedrest patient was counseled extensively regarding  labor and will contact her provider immediately if she notices any  labor.  We have not scheduled the patient back with us as she plans to have her cerclage removed after her  while she still anesthetized.  We would be happy to see the patient " anytime if needed.      2. Morbid obesity with BMI of 40.0-44.9, adult    3. Echogenic focus of heart of fetus affecting antepartum care of mother, single or unspecified fetus  Assessment & Plan:  An EIF is defined as a small (<6 mm) echogenic area in either cardiac ventricle that is as bright as the surrounding bone and visualized in at least 2 separate planes.  EIFs may appear in either cardiac ventricle, although left-sided EIFs are more common and are thought to represent microcalcifications of papillary muscles.  Since the first descriptions of EIFs as a soft marker for trisomy 21, a subsequent large body of literature has demonstrated varying positive likelihood ratios (LR) for trisomy 21, depending on the population studied (low-risk vs high-risk) and whether the EIF was isolated or in combination with other soft markers.  Overall, isolated EIFs have a positive LR ranging between 1.4 and 1.8 with a lower confidence interval extending to or lower than 1, suggesting a minimal risk.      I discussed how an echogenic intracardiac focus is not a true birth defect as it may be seen in ~ 1/25 normal  fetuses and up to ~ 1/10  fetuses. These women should be offered noninvasive screening with an MSAFP quad screen at the appropriate gestational age or cell-free fetal DNA evaluation.  For pregnant people with a negative serum or cfDNA screening results and an isolated EIF, ACOG and SMFM currently recommend no further evaluation, as this finding is a normal variant of no clinical importance with no indication for fetal echocardiography, follow-up ultrasound imaging or  evaluation (FIXHG2T).      4. Previous  delivery affecting pregnancy         Follow Up  No follow-ups on file.    I spent 20 minutes caring for the patient on the day of service. This included: obtaining or reviewing a separately obtained medical history, reviewing patient records, performing a medically appropriate exam  and/or evaluation, counseling or educating the patient/family/caregiver, ordering medications, labs, and/or procedures and documenting such in the medical record. This does not include time spent on review and interpretation of other tests such as fetal ultrasound or the performance of other procedures such as amniocentesis or CVS.      Douglas A. Milligan, MD  Maternal Fetal Medicine, [facility]     2024

## 2024-02-21 NOTE — PROGRESS NOTES
Pt has appt with DELIA Bojorquez on 3/13/24. NIPT negative. Pt denies vaginal bleeding or abdominal pain. Pt with history of GDM.

## 2025-03-20 ENCOUNTER — TRANSCRIBE ORDERS (OUTPATIENT)
Dept: OBSTETRICS AND GYNECOLOGY | Facility: HOSPITAL | Age: 35
End: 2025-03-20
Payer: COMMERCIAL

## 2025-03-20 DIAGNOSIS — Z34.90 PREGNANCY, UNSPECIFIED GESTATIONAL AGE: ICD-10-CM

## 2025-03-20 DIAGNOSIS — O34.219 PREVIOUS CESAREAN DELIVERY AFFECTING PREGNANCY: ICD-10-CM

## 2025-03-20 DIAGNOSIS — E66.01 MORBID OBESITY WITH BMI OF 40.0-44.9, ADULT: ICD-10-CM

## 2025-03-20 DIAGNOSIS — N88.3 INCOMPETENT CERVIX: Primary | ICD-10-CM

## 2025-04-21 ENCOUNTER — TELEPHONE (OUTPATIENT)
Dept: OBSTETRICS AND GYNECOLOGY | Facility: HOSPITAL | Age: 35
End: 2025-04-21
Payer: COMMERCIAL

## 2025-04-21 NOTE — TELEPHONE ENCOUNTER
Spoke with Lucía at [facility]. Requested NIPT results to be faxed. Fax number given.

## 2025-04-22 ENCOUNTER — HOSPITAL ENCOUNTER (OUTPATIENT)
Dept: WOMENS IMAGING | Facility: HOSPITAL | Age: 35
Discharge: HOME | End: 2025-04-22
Payer: COMMERCIAL

## 2025-04-22 ENCOUNTER — OFFICE VISIT (OUTPATIENT)
Dept: OBSTETRICS AND GYNECOLOGY | Facility: HOSPITAL | Age: 35
End: 2025-04-22
Payer: COMMERCIAL

## 2025-04-22 VITALS
BODY MASS INDEX: 44.72 KG/M2 | DIASTOLIC BLOOD PRESSURE: 60 MMHG | SYSTOLIC BLOOD PRESSURE: 112 MMHG | HEIGHT: 62 IN | WEIGHT: 243 LBS

## 2025-04-22 DIAGNOSIS — Z34.90 PREGNANCY, UNSPECIFIED GESTATIONAL AGE: ICD-10-CM

## 2025-04-22 DIAGNOSIS — N88.3 INCOMPETENT CERVIX: Primary | ICD-10-CM

## 2025-04-22 DIAGNOSIS — O34.219 PREVIOUS CESAREAN DELIVERY AFFECTING PREGNANCY: ICD-10-CM

## 2025-04-22 DIAGNOSIS — E66.01 MORBID OBESITY WITH BMI OF 40.0-44.9, ADULT: ICD-10-CM

## 2025-04-22 DIAGNOSIS — N88.3 INCOMPETENT CERVIX: ICD-10-CM

## 2025-04-22 DIAGNOSIS — E66.01 MORBID OBESITY WITH BMI OF 45.0-49.9, ADULT: ICD-10-CM

## 2025-04-22 PROBLEM — O35.BXX0 ECHOGENIC FOCUS OF HEART OF FETUS AFFECTING ANTEPARTUM CARE OF MOTHER: Status: RESOLVED | Noted: 2024-02-21 | Resolved: 2025-04-22

## 2025-04-22 PROCEDURE — 76801 OB US < 14 WKS SINGLE FETUS: CPT

## 2025-04-22 PROCEDURE — 76813 OB US NUCHAL MEAS 1 GEST: CPT

## 2025-04-22 NOTE — ASSESSMENT & PLAN NOTE
Patient with 2 previous pregnancies complicated by incompetent cervix.  First pregnancy required emergency cerclage on the patient had shortening and funneling of her cervix in early gestation.  That pregnancy progressed to term.  Her most recent pregnancy was managed with a prophylactic cerclage patient progressed to term with that pregnancy.    Ultrasound today demonstrates a normally grown fetus with no abnormality seen.  Nuchal translucency was normal in nasal bone was present.  Cervical length was normal today.    Patient was counseled regarding options including prophylactic cerclage.  Patient had very few questions as she has been through this process before.  She elects to proceed with a prophylactic cerclage.  We will schedule that in the next couple weeks.  Patient is scheduled back for an anatomic survey and to evaluate cervical length at 18 weeks gestation.

## 2025-04-22 NOTE — LETTER
2025     JOSÉ MIGUEL Carr  [address] [facility] [id number]    Patient: Pau Canela   YOB: 1990   Date of Visit: 2025     Dear JOSÉ MIGUEL Carr:       Thank you for referring Pau Canela to me for evaluation. Below are the relevant portions of my assessment and plan of care.    If you have questions, please do not hesitate to call me. I look forward to following Pau along with you.         Sincerely,        Douglas A. Milligan, MD        CC: No Recipients    Milligan, Douglas A, MD  [phone]  Sign when Signing Visit  Documentation of the ultrasound findings, images, and interpretations will be available in the patient's Viewpoint report which is located in the imaging tab in chart review.    Maternal/Fetal Medicine Consult Note     Name: Pau Canela    : 1990     MRN: [id number]     Referring Provider: JOSÉ MIGUEL Carr    Chief Complaint  Hx incompetent cervix; Hx GDM; PCOS; prev. c/s x2; obesity    Subjective     History of Present Illness:  Pau Canela is a 34 y.o.  11w2d who presents today for incompetent cervix    DIALLO: Estimated Date of Delivery: 25     ROS:   As noted in HPI.     Past Medical History:   Diagnosis Date   • Echogenic focus of heart of fetus affecting antepartum care of mother 2024   • History of cervical cerclage    • History of gestational diabetes     x2   • Obesity, Class III, BMI 40-49.9 (morbid obesity)    • PCOS (polycystic ovarian syndrome)       Past Surgical History:   Procedure Laterality Date   • CERVICAL CERCLAGE Bilateral 2018    Procedure: CERVICAL CERCLAGE, VAGINAL * 21WKS *;  Surgeon: Douglas A Milligan, MD;  Location: [facility] LABOR DELIVERY;  Service:    • CERVICAL CERCLAGE N/A 2023    Procedure: CERVICAL CERCLAGE, VAGINAL;  Surgeon: Milligan, Douglas A, MD;  Location: [facility] LABOR DELIVERY;  Service: Obstetrics/Gynecology;  Laterality: N/A;   •  SECTION     •  SECTION  2024   •  "CHOLECYSTECTOMY        OB History          4    Para   2    Term   2       0    AB   1    Living   2         SAB   1    IAB   0    Ectopic   0    Molar   0    Multiple   0    Live Births   2          Obstetric Comments   Fob #1 - Pregnancy #1 and #2  Fob #2 - Pregnancy #3 and #4  Pregnancy #1- GDM, Cerclage (rescue) - c/s for breech , oligohydramnios                Objective     Vital Signs  /60   Ht 156.2 cm (61.5\")   Wt 110 kg (243 lb)   LMP  (LMP Unknown)   Estimated body mass index is 45.17 kg/m² as calculated from the following:    Height as of this encounter: 156.2 cm (61.5\").    Weight as of this encounter: 110 kg (243 lb).    Physical Exam    Ultrasound Impression:   See Viewpoint    Assessment and Plan     Pau Canela is a 34 y.o.  11w2d who presents today for incompetent cervix    Diagnoses and all orders for this visit:    1. Incompetent cervix (Primary)  Assessment & Plan:  Patient with 2 previous pregnancies complicated by incompetent cervix.  First pregnancy required emergency cerclage on the patient had shortening and funneling of her cervix in early gestation.  That pregnancy progressed to term.  Her most recent pregnancy was managed with a prophylactic cerclage patient progressed to term with that pregnancy.    Ultrasound today demonstrates a normally grown fetus with no abnormality seen.  Nuchal translucency was normal in nasal bone was present.  Cervical length was normal today.    Patient was counseled regarding options including prophylactic cerclage.  Patient had very few questions as she has been through this process before.  She elects to proceed with a prophylactic cerclage.  We will schedule that in the next couple weeks.  Patient is scheduled back for an anatomic survey and to evaluate cervical length at 18 weeks gestation.    Orders:  -     [facility]; [facility]    2. Previous  delivery affecting pregnancy  -     [facility]; Future    3. Morbid obesity with BMI of 45.0-49.9, adult  -     [facility]; Future    4. Pregnancy, unspecified gestational age  -     [facility]; Future         Follow Up  Return in about 7 weeks (around 6/10/2025).    I spent 10 minutes caring for the patient on the day of service. This included: obtaining or reviewing a separately obtained medical history, reviewing patient records, performing a medically appropriate exam and/or evaluation, counseling or educating the patient/family/caregiver, ordering medications, labs, and/or procedures and documenting such in the medical record. This does not include time spent on review and interpretation of other tests such as fetal ultrasound or the performance of other procedures such as amniocentesis or CVS.      Douglas A. Milligan, MD  Maternal Fetal Medicine, [facility]    [facility] [place]     2025

## 2025-04-22 NOTE — PROGRESS NOTES
Patient denies any leaking of fluid, vaginal bleeding, or contractions.  NIPT negative.  Patient reports next follow-up appointment with KADY Mendoza's office is in 3 weeks.

## 2025-05-06 ENCOUNTER — ANESTHESIA (OUTPATIENT)
Dept: LABOR AND DELIVERY | Facility: HOSPITAL | Age: 35
End: 2025-05-06
Payer: COMMERCIAL

## 2025-05-06 ENCOUNTER — HOSPITAL ENCOUNTER (OUTPATIENT)
Facility: HOSPITAL | Age: 35
Discharge: HOME | End: 2025-05-06
Attending: OBSTETRICS & GYNECOLOGY | Admitting: OBSTETRICS & GYNECOLOGY
Payer: COMMERCIAL

## 2025-05-06 ENCOUNTER — ANESTHESIA EVENT (OUTPATIENT)
Dept: LABOR AND DELIVERY | Facility: HOSPITAL | Age: 35
End: 2025-05-06
Payer: COMMERCIAL

## 2025-05-06 VITALS
OXYGEN SATURATION: 94 % | HEART RATE: 65 BPM | TEMPERATURE: 98.5 F | HEIGHT: 62 IN | DIASTOLIC BLOOD PRESSURE: 63 MMHG | RESPIRATION RATE: 16 BRPM | SYSTOLIC BLOOD PRESSURE: 122 MMHG | BODY MASS INDEX: 44.72 KG/M2 | WEIGHT: 243 LBS

## 2025-05-06 DIAGNOSIS — O34.219 PREVIOUS CESAREAN DELIVERY AFFECTING PREGNANCY: Primary | ICD-10-CM

## 2025-05-06 DIAGNOSIS — E66.01 MORBID OBESITY WITH BMI OF 40.0-44.9, ADULT: ICD-10-CM

## 2025-05-06 DIAGNOSIS — O34.32 CERVICAL CERCLAGE SUTURE PRESENT IN SECOND TRIMESTER: ICD-10-CM

## 2025-05-06 DIAGNOSIS — Z34.90 PREGNANCY, UNSPECIFIED GESTATIONAL AGE: ICD-10-CM

## 2025-05-06 DIAGNOSIS — E66.01 MORBID OBESITY WITH BMI OF 45.0-49.9, ADULT: ICD-10-CM

## 2025-05-06 DIAGNOSIS — N88.3 INCOMPETENT CERVIX: ICD-10-CM

## 2025-05-06 DIAGNOSIS — O26.879 SHORT CERVIX AFFECTING PREGNANCY: ICD-10-CM

## 2025-05-06 PROCEDURE — 25010000002 FAMOTIDINE (PF) 20 MG/2ML SOLUTION: Performed by: NURSE ANESTHETIST, CERTIFIED REGISTERED

## 2025-05-06 PROCEDURE — 25010000002 ONDANSETRON PER 1 MG: Performed by: NURSE ANESTHETIST, CERTIFIED REGISTERED

## 2025-05-06 PROCEDURE — 25810000003 LACTATED RINGERS PER 1000 ML: Performed by: OBSTETRICS & GYNECOLOGY

## 2025-05-06 PROCEDURE — 25010000002 FENTANYL CITRATE (PF) 100 MCG/2ML SOLUTION: Performed by: NURSE ANESTHETIST, CERTIFIED REGISTERED

## 2025-05-06 PROCEDURE — 25010000002 CEFAZOLIN PER 500 MG: Performed by: OBSTETRICS & GYNECOLOGY

## 2025-05-06 PROCEDURE — 25010000002 MIDAZOLAM PER 1 MG: Performed by: NURSE ANESTHETIST, CERTIFIED REGISTERED

## 2025-05-06 PROCEDURE — 25010000002 BUPIVACAINE IN DEXTROSE 0.75-8.25 % SOLUTION: Performed by: NURSE ANESTHETIST, CERTIFIED REGISTERED

## 2025-05-06 PROCEDURE — 59320 REVISION OF CERVIX: CPT | Performed by: OBSTETRICS & GYNECOLOGY

## 2025-05-06 PROCEDURE — 63710000001 DIPHENHYDRAMINE PER 50 MG: Performed by: OBSTETRICS & GYNECOLOGY

## 2025-05-06 RX ORDER — FENTANYL CITRATE 50 UG/ML
INJECTION, SOLUTION INTRAMUSCULAR; INTRAVENOUS AS NEEDED
Status: DISCONTINUED | OUTPATIENT
Start: 2025-05-06 | End: 2025-05-06 | Stop reason: SURG

## 2025-05-06 RX ORDER — ACETAMINOPHEN AND CODEINE PHOSPHATE 300; 30 MG/1; MG/1
1 TABLET ORAL EVERY 4 HOURS PRN
Qty: 20 TABLET | Refills: 0 | Status: SHIPPED | OUTPATIENT
Start: 2025-05-06 | End: 2026-05-06

## 2025-05-06 RX ORDER — MIDAZOLAM HYDROCHLORIDE 1 MG/ML
INJECTION, SOLUTION INTRAMUSCULAR; INTRAVENOUS AS NEEDED
Status: DISCONTINUED | OUTPATIENT
Start: 2025-05-06 | End: 2025-05-06 | Stop reason: SURG

## 2025-05-06 RX ORDER — DIPHENHYDRAMINE HCL 25 MG
25 CAPSULE ORAL ONCE
Status: COMPLETED | OUTPATIENT
Start: 2025-05-06 | End: 2025-05-06

## 2025-05-06 RX ORDER — FAMOTIDINE 10 MG/ML
INJECTION, SOLUTION INTRAVENOUS AS NEEDED
Status: DISCONTINUED | OUTPATIENT
Start: 2025-05-06 | End: 2025-05-06 | Stop reason: SURG

## 2025-05-06 RX ORDER — BUPIVACAINE HYDROCHLORIDE 7.5 MG/ML
INJECTION, SOLUTION INTRASPINAL AS NEEDED
Status: DISCONTINUED | OUTPATIENT
Start: 2025-05-06 | End: 2025-05-06 | Stop reason: SURG

## 2025-05-06 RX ORDER — ACETAMINOPHEN AND CODEINE PHOSPHATE 300; 30 MG/1; MG/1
1-2 TABLET ORAL EVERY 4 HOURS PRN
Qty: 20 TABLET | Refills: 0 | OUTPATIENT
Start: 2025-05-06

## 2025-05-06 RX ORDER — CITRIC ACID/SODIUM CITRATE 334-500MG
30 SOLUTION, ORAL ORAL ONCE
Status: COMPLETED | OUTPATIENT
Start: 2025-05-06 | End: 2025-05-06

## 2025-05-06 RX ORDER — IBUPROFEN 600 MG/1
600 TABLET, FILM COATED ORAL ONCE
Status: COMPLETED | OUTPATIENT
Start: 2025-05-06 | End: 2025-05-06

## 2025-05-06 RX ORDER — SODIUM CHLORIDE, SODIUM LACTATE, POTASSIUM CHLORIDE, CALCIUM CHLORIDE 600; 310; 30; 20 MG/100ML; MG/100ML; MG/100ML; MG/100ML
125 INJECTION, SOLUTION INTRAVENOUS CONTINUOUS
Status: ACTIVE | OUTPATIENT
Start: 2025-05-06 | End: 2025-05-06

## 2025-05-06 RX ORDER — ONDANSETRON 2 MG/ML
INJECTION INTRAMUSCULAR; INTRAVENOUS AS NEEDED
Status: DISCONTINUED | OUTPATIENT
Start: 2025-05-06 | End: 2025-05-06 | Stop reason: SURG

## 2025-05-06 RX ORDER — OXYCODONE AND ACETAMINOPHEN 5; 325 MG/1; MG/1
1 TABLET ORAL EVERY 4 HOURS PRN
Refills: 0 | Status: DISCONTINUED | OUTPATIENT
Start: 2025-05-06 | End: 2025-05-06 | Stop reason: HOSPADM

## 2025-05-06 RX ADMIN — DIPHENHYDRAMINE HYDROCHLORIDE 25 MG: 25 CAPSULE ORAL at 11:49

## 2025-05-06 RX ADMIN — CEFAZOLIN 1000 MG: 1 INJECTION, POWDER, FOR SOLUTION INTRAMUSCULAR; INTRAVENOUS at 07:29

## 2025-05-06 RX ADMIN — MIDAZOLAM HYDROCHLORIDE 1 MG: 1 INJECTION, SOLUTION INTRAMUSCULAR; INTRAVENOUS at 07:45

## 2025-05-06 RX ADMIN — SODIUM CHLORIDE, SODIUM LACTATE, POTASSIUM CHLORIDE, AND CALCIUM CHLORIDE 1000 ML/HR: 600; 310; 30; 20 INJECTION, SOLUTION INTRAVENOUS at 07:30

## 2025-05-06 RX ADMIN — FAMOTIDINE 20 MG: 10 INJECTION, SOLUTION INTRAVENOUS at 07:45

## 2025-05-06 RX ADMIN — FENTANYL CITRATE 20 MCG: 50 INJECTION, SOLUTION INTRAMUSCULAR; INTRAVENOUS at 07:51

## 2025-05-06 RX ADMIN — BUPIVACAINE HYDROCHLORIDE IN DEXTROSE 1.4 ML: 7.5 INJECTION, SOLUTION SUBARACHNOID at 07:51

## 2025-05-06 RX ADMIN — SODIUM CITRATE AND CITRIC ACID MONOHYDRATE 30 ML: 500; 334 SOLUTION ORAL at 07:35

## 2025-05-06 RX ADMIN — SODIUM CHLORIDE, SODIUM LACTATE, POTASSIUM CHLORIDE, AND CALCIUM CHLORIDE 125 ML/HR: 600; 310; 30; 20 INJECTION, SOLUTION INTRAVENOUS at 06:55

## 2025-05-06 RX ADMIN — IBUPROFEN 600 MG: 600 TABLET, FILM COATED ORAL at 07:00

## 2025-05-06 RX ADMIN — ONDANSETRON 4 MG: 2 INJECTION INTRAMUSCULAR; INTRAVENOUS at 07:45

## 2025-05-06 NOTE — H&P
"[facility]  Obstetric History and Physical  Referring Provider: KADY Mendoza    No chief complaint on file.      Subjective     Patient is a 34 y.o. female  currently at 13w2d, who presents with incompetent cervix.    Her prenatal care is benign.  Her previous obstetric/gynecological history is noted for  rescue cerclage in her first pregnancy.  Prior C/S x2.    NKDA    NO bleeding or contractions.  Family history N/C        Prenatal Information:   Maternal Prenatal Labs  Blood Type No results found for: \"ABO\"   Rh Status No results found for: \"RH\"   Antibody Screen No results found for: \"ABSCRN\"   Gonnorhea No results found for: \"GCCX\"   Chlamydia No results found for: \"CLAMYDCU\"   RPR No results found for: \"RPR\"       Rubella No results found for: \"RUBELLAIGGIN\"   Hepatitis B Surface Antigen No results found for: \"HEPBSAG\"   HIV-1 Antibody No results found for: \"LABHIV1\"   Hepatitis C Antibody No results found for: \"HEPCAB\"   Rapid Urin Drug Screen No results found for: \"AMPMETHU\", \"BARBITSCNUR\", \"LABBENZSCN\", \"LABMETHSCN\", \"LABOPIASCN\", \"THCURSCR\", \"COCAINEUR\", \"AMPHETSCREEN\", \"PROPOXSCN\", \"BUPRENORSCNU\", \"METAMPSCNUR\", \"OXYCODONESCN\", \"TRICYCLICSCN\"   Group B Strep Culture No results found for: \"GBSANTIGEN\"           External Prenatal Results       Pregnancy Outside Results - Transcribed From Office Records - See Scanned Records For Details       Test Value Date Time    ABO  A  18 1220    Rh  Positive  18 1220    Antibody Screen       Varicella IgG       Rubella       Hgb       Hct       HgB A1c        1h GTT       3h GTT Fasting       3h GTT 1 hour       3h GTT 2 hour       3h GTT 3 hour        Gonorrhea (discrete)       Chlamydia (discrete)       RPR       Syphils cascade: TP-Ab (FTA)       TP-Ab       TP-Ab (EIA)       TPPA       HBsAg       Herpes Simplex Virus PCR       Herpes Simplex VIrus Culture       HIV       Hep C RNA Quant PCR       Hep C Antibody       AFP       NIPT       " Cystic Fibrosis ([place])       Cystic Fibroisis        Spinal Muscular atrophy       Fragile X       Group B Strep       GBS Susceptibility to Clindamycin       GBS Susceptibility to Erythromycin       Fetal Fibronectin       Genetic Testing, Maternal Blood                 Drug Screening       Test Value Date Time    Urine Drug Screen       Amphetamine Screen       Barbiturate Screen       Benzodiazepine Screen       Methadone Screen       Phencyclidine Screen       Opiates Screen       THC Screen       Cocaine Screen       Propoxyphene Screen       Buprenorphine Screen       Methamphetamine Screen       Oxycodone Screen       Tricyclic Antidepressants Screen                 Legend    ^: Historical                              Past OB History:       OB History    Para Term  AB Living   4 2 2 0 1 2   SAB IAB Ectopic Molar Multiple Live Births   1 0 0 0 0 2      # Outcome Date GA Lbr Diaz/2nd Weight Sex Type Anes PTL Lv   4 Current            3 Term 24 39w2d  3884 g (8 lb 9 oz) F CS-Unspec Spinal N ROBERTO   2 SAB 19 5w0d    SAB      1 Term 18 37w3d  2722 g (6 lb) M CS-LTranv Spinal  ROBERTO      Complications: Gestational diabetes, Breech presentation      Obstetric Comments   Fob #1 - Pregnancy #1 and #2   Fob #2 - Pregnancy #3 and #4   Pregnancy #1- GDM, Cerclage (rescue) - c/s for breech , oligohydramnios        Past Medical History: Past Medical History:   Diagnosis Date    Anxiety     Depression     Echogenic focus of heart of fetus affecting antepartum care of mother 2024    History of cervical cerclage     History of gestational diabetes     x2    Obesity, Class III, BMI 40-49.9 (morbid obesity)     PCOS (polycystic ovarian syndrome)       Past Surgical History Past Surgical History:   Procedure Laterality Date    CERVICAL CERCLAGE Bilateral 2018    Procedure: CERVICAL CERCLAGE, VAGINAL * 21WKS *;  Surgeon: Douglas A Milligan, MD;  Location: [facility] LABOR DELIVERY;   Service:     CERVICAL CERCLAGE N/A 2023    Procedure: CERVICAL CERCLAGE, VAGINAL;  Surgeon: Milligan, Douglas A, MD;  Location: [facility] LABOR DELIVERY;  Service: Obstetrics/Gynecology;  Laterality: N/A;     SECTION  2018     SECTION  2024    CHOLECYSTECTOMY  2010      Family History: Family History   Problem Relation Age of Onset    Diabetes Mother     Heart disease Mother       Social History:  reports that she quit smoking about 2 years ago. Her smoking use included cigarettes. She started smoking about 17 years ago. She has a 3.8 pack-year smoking history. She has never used smokeless tobacco.   reports that she does not currently use alcohol.   reports no history of drug use.        General ROS: Pertinent items are noted in HPI, all other systems reviewed and negative    Objective       Vital Signs Range for the last 24 hours  Temperature: Temp:  [98.9 °F (37.2 °C)] 98.9 °F (37.2 °C)   Temp Source: Temp src: Oral   BP: BP: (122)/(77) 122/77   Pulse: Heart Rate:  [84] 84   Respirations: Resp:  [16] 16               Weight: Weight:  [110 kg (243 lb)] 110 kg (243 lb)     Physical Examination: General appearance - alert, well appearing, and in no distress  Mental status - alert, oriented to person, place, and time  Eyes - sclera anicteric, left eye normal, right eye normal  Ears - right ear normal, left ear normal  Mouth - mucous membranes moist, pharynx normal without lesions  Neck - supple, no significant adenopathy  Chest - no tachypnea, retractions or cyanosis  Heart - normal rate and regular rhythm  Abdomen - soft, nontender, nondistended, no masses or organomegaly  Neurological - alert, oriented, normal speech, no focal findings or movement disorder noted  Musculoskeletal - no joint tenderness, deformity or swelling  Extremities - peripheral pulses normal, no pedal edema, no clubbing or cyanosis  Skin - normal coloration and turgor, no rashes, no suspicious skin lesions  "noted    Presentation: varible   Cervix: Exam by:     Dilation:     Effacement:     Station:         Fetal Heart Rate Assessment   Method:     Beats/min:     Baseline:     Varibility:     Accels:     Decels:     Tracing Category:       Uterine Assessment   Method:     Frequency (min):     Ctx Count in 10 min:     Duration:     Intensity:     Intensity by IUPC:     Resting Tone:     Resting Tone by IUPC:     Sherman Units:       Laboratory Results: PNL reviewed  Radiology Review: see Viewpoint by me  Other Studies: nl FHTs    Assessment & Plan       Incompetent cervix        Assessment:  1.  Intrauterine pregnancy at 13w2d weeks gestation with reassuring fetal status.    2.  Incompetent cervix  3.  Obstetrical history significant for is non-contributory.  4.  GBS status: No results found for: \"GBSANTIGEN\"    Plan:  1. Bagley cerclage  2. Plan of care has been reviewed with patient.  3.  Risks, benefits of treatment plan have been discussed.  4.  All questions have been answered.      Douglas A. Milligan, MD  5/6/2025  06:43 EDT  "

## 2025-05-06 NOTE — ANESTHESIA POSTPROCEDURE EVALUATION
Patient: Pau Canela    Procedure Summary       Date: 05/06/25 Room / Location: [facility] LABOR DELIVERY 1 / [facility] LABOR DELIVERY    Anesthesia Start: 0744 Anesthesia Stop: 0820    Procedure: CERVICAL CERCLAGE (Cervix) Diagnosis:     Surgeons: Milligan, Douglas A, MD Provider: Toño Hatch DO    Anesthesia Type: ITN, spinal ASA Status: 3            Anesthesia Type: ITN, spinal    Vitals  Vitals Value Taken Time   /53 05/06/25 08:14   Temp 98.5 °F (36.9 °C) 05/06/25 08:14   Pulse 74 05/06/25 08:19   Resp 16 05/06/25 08:14   SpO2 98 % 05/06/25 08:19   Vitals shown include unfiled device data.        Post Anesthesia Care and Evaluation    Patient location during evaluation: bedside  Patient participation: complete - patient participated  Level of consciousness: awake and alert  Pain management: adequate    Airway patency: patent  Anesthetic complications: No anesthetic complications    Cardiovascular status: acceptable  Respiratory status: acceptable  Hydration status: acceptable

## 2025-05-06 NOTE — ANESTHESIA PREPROCEDURE EVALUATION
Anesthesia Evaluation     Patient summary reviewed and Nursing notes reviewed   NPO Solid Status: > 8 hours  NPO Liquid Status: > 2 hours           Airway   Mallampati: II  TM distance: >3 FB  Neck ROM: full  Dental      Pulmonary - negative pulmonary ROS   Cardiovascular - negative cardio ROS        Neuro/Psych  (+) psychiatric history Anxiety and Depression  GI/Hepatic/Renal/Endo    (+) morbid obesity    Musculoskeletal (-) negative ROS    Abdominal    Substance History - negative use     OB/GYN    (+) Pregnant        Other - negative ROS                   Anesthesia Plan    ASA 3     ITN and spinal       Anesthetic plan, risks, benefits, and alternatives have been provided, discussed and informed consent has been obtained with: patient.    Use of blood products discussed with patient .    Plan discussed with attending.    CODE STATUS:

## 2025-05-06 NOTE — OP NOTE
[facility]  Cerclge Operative Note    CC; Incompetent cervix    Pre-Operative Dx:   1.  IUP at 13 weeks   2. Incompetent cervix      Postoperative dx:    1.  Same     Procedure: Procedure(s):  CERVICAL CERCLAGE   Surgeon: Milligan   Assistant:    Anesthesia: SAB   EBL:   50  mls.                 Antibiotics: cefazolin (Ancef)       Procedure Details:   Indication: Patient had a previous pregnancy complicated by painless dilatation and had a emergent cerclage placed.  Her second pregnancy she had a prophylactic cerclage.  Patient was counseled regarding options and elected proceed with a pro lactic cerclage this pregnancy.    Procedure: Patient was brought into the operating room placed on the operating table and had a subarachnoid block placed by the anesthesia service.  She was then placed in high lithotomy position prepped and draped in usual sterile fashion.  After ascertaining adequate anesthesia a weighted speculum was placed in posterior vagina and the anterior lip of the cervix was grasped with a ring forcep.  A 5 mm Mersilene band was then placed in pursestring fashion about the entire cervix as high as possible.  There appeared to be good purchase around the entire cervix.  Cerclage was then tied down over a loop of 0 Prolene to aid in removal of the cerclage.  There was good approximation of the endocervical canal.  There was approximately 2 cm of cervix distal to the cerclage.    The procedure was terminated at this point and instruments were removed from the vagina.  The bladder was emptied of 50 mL of clear urine.  Patient was taken out of high lithotomy position transferred to hospital bed and transported recovery room awake and in stable condition    There were no complications.  Estimated blood loss was 50 mL.     Drains: none     Complications:   None      Disposition:   Mother to RR in stable condition currently.   Discharge when meets criteria.       Douglas A. Milligan, MD  5/6/2025  08:16  EDT

## 2025-05-06 NOTE — PROGRESS NOTES
Transabdominal ultrasound confirms fetal heart rate of 150s.    Dilma Anderson MD  5/6/2025  07:43 EDT

## 2025-05-06 NOTE — ANESTHESIA PROCEDURE NOTES
Spinal Block      Patient reassessed immediately prior to procedure    Patient location during procedure: OR  Indication:at surgeon's request  Performed By  CRNA/NELIDA: Jillian Doshi CRNA  Preanesthetic Checklist  Completed: patient identified, IV checked, site marked, risks and benefits discussed, surgical consent, monitors and equipment checked, pre-op evaluation and timeout performed  Spinal Block Prep:  Patient Position:sitting  Sterile Tech:cap, gloves, mask and sterile barriers  Prep:Betadine  Patient Monitoring:blood pressure monitoring, continuous pulse oximetry and EKG    Spinal Block Procedure  Approach:midline  Guidance:palpation technique  Location:L3-L4  Needle Type:Gabriela  Needle Gauge:25 G  Placement of Spinal needle event:cerebrospinal fluid aspirated  Paresthesia: no  Fluid Appearance:clear     Post Assessment  Patient Tolerance:patient tolerated the procedure well with no apparent complications  Complications no

## 2025-06-11 ENCOUNTER — HOSPITAL ENCOUNTER (OUTPATIENT)
Dept: WOMENS IMAGING | Facility: HOSPITAL | Age: 35
Discharge: HOME | End: 2025-06-11
Admitting: OBSTETRICS & GYNECOLOGY
Payer: COMMERCIAL

## 2025-06-11 ENCOUNTER — OFFICE VISIT (OUTPATIENT)
Dept: OBSTETRICS AND GYNECOLOGY | Facility: HOSPITAL | Age: 35
End: 2025-06-11
Payer: COMMERCIAL

## 2025-06-11 VITALS — DIASTOLIC BLOOD PRESSURE: 53 MMHG | BODY MASS INDEX: 44.69 KG/M2 | SYSTOLIC BLOOD PRESSURE: 105 MMHG | WEIGHT: 240.4 LBS

## 2025-06-11 DIAGNOSIS — N88.3 INCOMPETENT CERVIX: ICD-10-CM

## 2025-06-11 DIAGNOSIS — E66.01 MORBID OBESITY WITH BMI OF 45.0-49.9, ADULT: ICD-10-CM

## 2025-06-11 DIAGNOSIS — O34.219 PREVIOUS CESAREAN DELIVERY AFFECTING PREGNANCY: ICD-10-CM

## 2025-06-11 DIAGNOSIS — Z34.90 PREGNANCY, UNSPECIFIED GESTATIONAL AGE: ICD-10-CM

## 2025-06-11 DIAGNOSIS — N88.3 INCOMPETENT CERVIX: Primary | ICD-10-CM

## 2025-06-11 PROCEDURE — 76811 OB US DETAILED SNGL FETUS: CPT

## 2025-06-11 NOTE — ASSESSMENT & PLAN NOTE
Patient presents for follow-up secondary to a history of incompetent cervix.  Patient had a history indicated cerclage in second pregnancy and ultimately delivered at term.  In this pregnancy patient had history indicated cerclage performed by Dr. Milligan.  Today's ultrasound shows normal cervical length greater than 3.5 cm with cerclage noted.  No funneling or debris noted.  Plan for follow-up in 4 weeks.  We discussed careful return precautions regarding  birth.

## 2025-06-11 NOTE — LETTER
"2025     JOSÉ MIGUEL Carr  [address] [facility] [address]    Patient: Pau Canela   YOB: 1990   Date of Visit: 2025     Dear JOSÉ MIGUEL Carr:       Thank you for referring Pau Canela to me for evaluation. Below are the relevant portions of my assessment and plan of care.    If you have questions, please do not hesitate to call me. I look forward to following Pau along with you.         Sincerely,        Layo Amaral MD        CC: No Recipients    Layo Amaral MD  [phone]  Sign when Signing Visit      Maternal/Fetal Medicine Consult Note   Date: 2025  Name: Pau Canela    : 1990     MRN: [id number]     Referring Provider: JOSÉ MIGUEL Carr    Chief Complaint  hx cervical incompetence; s/p cerclage; prev. c/s; MO    Subjective     History of Present Illness:  Pau Canela is a 34 y.o.  18w3d who presents today for incompetent cervix    DIALLO: Estimated Date of Delivery: 25     ROS:   Otherwise Noted in HPI      Current Outpatient Medications:   •  CeleXA 20 MG tablet, Take 1 tablet by mouth Daily., Disp: , Rfl:   •  Prenatal Vit-Fe Fumarate-FA (PRENATAL 27-1) 27-1 MG tablet tablet, Take  by mouth Daily., Disp: , Rfl:   •  acetaminophen-codeine (TYLENOL with CODEINE #3) 300-30 MG per tablet, Take 1 tablet by mouth Every 4 (Four) Hours As Needed for Moderate Pain., Disp: 20 tablet, Rfl: 0    Objective     Vital Signs  /53   Wt 109 kg (240 lb 6.4 oz)   LMP  (LMP Unknown)   Estimated body mass index is 44.69 kg/m² as calculated from the following:    Height as of 25: 156.2 cm (61.5\").    Weight as of this encounter: 109 kg (240 lb 6.4 oz).    Ultrasound Impression:   See Viewpoint     Assessment and Plan     Pau Canela is a 34 y.o.  18w3d who presents today for incompetent cervix    Diagnoses and all orders for this visit:    1. Incompetent cervix (Primary)  Assessment & Plan:  Patient presents for follow-up secondary to a history of " incompetent cervix.  Patient had a history indicated cerclage in second pregnancy and ultimately delivered at term.  In this pregnancy patient had history indicated cerclage performed by Dr. Milligan.  Today's ultrasound shows normal cervical length greater than 3.5 cm with cerclage noted.  No funneling or debris noted.  Plan for follow-up in 4 weeks.  We discussed careful return precautions regarding  birth.           Follow Up  4-week    I spent 10 minutes caring for the patient on the day of service. This included: obtaining or reviewing a separately obtained medical history, reviewing patient records, performing a medically appropriate exam and/or evaluation, counseling or educating the patient/family/caregiver, ordering medications, labs, and/or procedures and documenting such in the medical record. This does not include time spent on review and interpretation of other tests such as fetal ultrasound or the performance of other procedures such as amniocentesis or CVS.      Layo Amaral MD, FACOG  Maternal Fetal Medicine, [facility] [place]

## 2025-06-11 NOTE — PROGRESS NOTES
Patient denies any leaking of fluid, vaginal bleeding, or contractions. She reports minor cramping  NIPT negative.  Patient reports next follow-up appointment with KADY Mendoza's office is in 2 weeks.

## 2025-06-11 NOTE — PROGRESS NOTES
"    Maternal/Fetal Medicine Consult Note   Date: 2025  Name: Pau Canela    : 1990     MRN: [id number]     Referring Provider: JOSÉ MIGUEL Carr    Chief Complaint  hx cervical incompetence; s/p cerclage; prev. c/s; MO    Subjective     History of Present Illness:  Pau Canela is a 34 y.o.  18w3d who presents today for incompetent cervix    DIALLO: Estimated Date of Delivery: 25     ROS:   Otherwise Noted in HPI      Current Outpatient Medications:     CeleXA 20 MG tablet, Take 1 tablet by mouth Daily., Disp: , Rfl:     Prenatal Vit-Fe Fumarate-FA (PRENATAL 27-1) 27-1 MG tablet tablet, Take  by mouth Daily., Disp: , Rfl:     acetaminophen-codeine (TYLENOL with CODEINE #3) 300-30 MG per tablet, Take 1 tablet by mouth Every 4 (Four) Hours As Needed for Moderate Pain., Disp: 20 tablet, Rfl: 0    Objective     Vital Signs  /53   Wt 109 kg (240 lb 6.4 oz)   LMP  (LMP Unknown)   Estimated body mass index is 44.69 kg/m² as calculated from the following:    Height as of 25: 156.2 cm (61.5\").    Weight as of this encounter: 109 kg (240 lb 6.4 oz).    Ultrasound Impression:   See Viewpoint     Assessment and Plan     Pau Canela is a 34 y.o.  18w3d who presents today for incompetent cervix    Diagnoses and all orders for this visit:    1. Incompetent cervix (Primary)  Assessment & Plan:  Patient presents for follow-up secondary to a history of incompetent cervix.  Patient had a history indicated cerclage in second pregnancy and ultimately delivered at term.  In this pregnancy patient had history indicated cerclage performed by Dr. Milligan.  Today's ultrasound shows normal cervical length greater than 3.5 cm with cerclage noted.  No funneling or debris noted.  Plan for follow-up in 4 weeks.  We discussed careful return precautions regarding  birth.           Follow Up  4-week    I spent 10 minutes caring for the patient on the day of service. This included: obtaining or " reviewing a separately obtained medical history, reviewing patient records, performing a medically appropriate exam and/or evaluation, counseling or educating the patient/family/caregiver, ordering medications, labs, and/or procedures and documenting such in the medical record. This does not include time spent on review and interpretation of other tests such as fetal ultrasound or the performance of other procedures such as amniocentesis or CVS.      Layo Amaral MD, FACOG  Maternal Fetal Medicine, [facility]

## 2025-07-16 ENCOUNTER — HOSPITAL ENCOUNTER (OUTPATIENT)
Dept: WOMENS IMAGING | Facility: HOSPITAL | Age: 35
Discharge: HOME | End: 2025-07-16
Admitting: OBSTETRICS & GYNECOLOGY
Payer: COMMERCIAL

## 2025-07-16 ENCOUNTER — OFFICE VISIT (OUTPATIENT)
Dept: OBSTETRICS AND GYNECOLOGY | Facility: HOSPITAL | Age: 35
End: 2025-07-16
Payer: COMMERCIAL

## 2025-07-16 VITALS — BODY MASS INDEX: 45.28 KG/M2 | SYSTOLIC BLOOD PRESSURE: 111 MMHG | WEIGHT: 243.6 LBS | DIASTOLIC BLOOD PRESSURE: 53 MMHG

## 2025-07-16 DIAGNOSIS — N88.3 INCOMPETENT CERVIX: ICD-10-CM

## 2025-07-16 DIAGNOSIS — Z34.90 PREGNANCY, UNSPECIFIED GESTATIONAL AGE: ICD-10-CM

## 2025-07-16 DIAGNOSIS — O34.219 PREVIOUS CESAREAN DELIVERY AFFECTING PREGNANCY: ICD-10-CM

## 2025-07-16 DIAGNOSIS — N88.3 INCOMPETENT CERVIX: Primary | ICD-10-CM

## 2025-07-16 DIAGNOSIS — E66.01 MORBID OBESITY WITH BMI OF 45.0-49.9, ADULT: ICD-10-CM

## 2025-07-16 PROCEDURE — 76816 OB US FOLLOW-UP PER FETUS: CPT

## 2025-07-16 NOTE — PROGRESS NOTES
Patient denies any leaking of fluid or vaginal bleeding. She reports occasional mild cramping.  NIPT negative.  Patient reports next follow-up appointment with KADY Mendoza's office is 7/24.

## 2025-07-16 NOTE — PROGRESS NOTES
"Documentation of the ultrasound findings, images, and interpretations will be available in the patient's Viewpoint report which is located in the imaging tab in chart review.    Maternal/Fetal Medicine Follow Up  Note     Name: Pau Canela    : 1990     MRN: [id number]     Referring Provider: JOSÉ MIGUEL Carr    Chief Complaint  s/p cerclage; MO; hx incompetent cervix; prev. c/s x2    Subjective     History of Present Illness:  Pau Canela is a 34 y.o.  23w3d who presents today for incompetent cervix    DIALLO: Estimated Date of Delivery: 25     ROS:   As noted in HPI.     Objective     Vital Signs  /53   Wt 110 kg (243 lb 9.6 oz)   LMP  (LMP Unknown)   Estimated body mass index is 45.28 kg/m² as calculated from the following:    Height as of 25: 156.2 cm (61.5\").    Weight as of this encounter: 110 kg (243 lb 9.6 oz).    Physical Exam    Ultrasound Impression:   See Viewpoint    Assessment and Plan     Pau Canela is a 34 y.o.  23w3d who presents today for incompetent cervix    Diagnoses and all orders for this visit:    1. Incompetent cervix (Primary)  Assessment & Plan:  Patient returns today for follow-up for pregnancy complicated by incompetent cervix.  Patient had a prophylactic cerclage placed earlier in pregnancy.  Patient reports occasional contractions but nothing regular.  Patient notes fetal movement.    Ultrasound today demonstrates a normally grown fetus with no abnormality seen.  In particular there were no fetal markers for trisomy.  Amniotic fluid volume and umbilical artery Dopplers were normal.  Cervix was greater than 2.75 cm with no significant funneling.    Patient appears to be doing well with her cerclage.  From this point on we will be guided by symptoms rather than cervical lengths.  If the patient has regular cramping or contractions we would evaluate the patient to determine need for toco lysis.  We will rescan the patient again in 8 weeks time to " assess fetal growth and wellbeing.    Patient was counseled extensively regarding  labor signs and symptoms.  She will contact her provider immediately if she notices any signs or symptoms of  labor.    Orders:  -     [facility]  [facility] Center; Future    2. Morbid obesity with BMI of 45.0-49.9, adult  -     [facility]; Future    3. Previous  delivery affecting pregnancy  -     [facility]; Future    4. Pregnancy, unspecified gestational age  -     [facility]; Future         Follow Up  Return in about 8 weeks (around 9/10/2025).    I spent 10 minutes caring for the patient on the day of service. This included: obtaining or reviewing a separately obtained medical history, reviewing patient records, performing a medically appropriate exam and/or evaluation, counseling or educating the patient/family/caregiver, ordering medications, labs, and/or procedures and documenting such in the medical record. This does not include time spent on review and interpretation of other tests such as fetal ultrasound or the performance of other procedures such as amniocentesis or CVS.      Douglas A. Milligan, MD  Maternal Fetal Medicine, [facility]    Diagnostic [place]     2025

## 2025-07-16 NOTE — LETTER
"2025     JOSÉ MIGUEL Carr  [address] [facility] [address]    Patient: Pau Canela   YOB: 1990   Date of Visit: 2025     Dear JOSÉ MIGUEL Carr:       Thank you for referring Pau Canela to me for evaluation. Below are the relevant portions of my assessment and plan of care.    If you have questions, please do not hesitate to call me. I look forward to following Pau along with you.         Sincerely,        Douglas A. Milligan, MD        CC: No Recipients    Milligan, Douglas A, MD  [phone]  Sign when Signing Visit  Documentation of the ultrasound findings, images, and interpretations will be available in the patient's Viewpoint report which is located in the imaging tab in chart review.    Maternal/Fetal Medicine Follow Up  Note     Name: Pau Canela    : 1990     MRN: [id number]     Referring Provider: JOSÉ MIGUEL Carr    Chief Complaint  s/p cerclage; MO; hx incompetent cervix; prev. c/s x2    Subjective     History of Present Illness:  Pau Canela is a 34 y.o.  23w3d who presents today for incompetent cervix    DIALLO: Estimated Date of Delivery: 25     ROS:   As noted in HPI.     Objective     Vital Signs  /53   Wt 110 kg (243 lb 9.6 oz)   LMP  (LMP Unknown)   Estimated body mass index is 45.28 kg/m² as calculated from the following:    Height as of 25: 156.2 cm (61.5\").    Weight as of this encounter: 110 kg (243 lb 9.6 oz).    Physical Exam    Ultrasound Impression:   See Viewpoint    Assessment and Plan     Pau Canela is a 34 y.o.  23w3d who presents today for incompetent cervix    Diagnoses and all orders for this visit:    1. Incompetent cervix (Primary)  Assessment & Plan:  Patient returns today for follow-up for pregnancy complicated by incompetent cervix.  Patient had a prophylactic cerclage placed earlier in pregnancy.  Patient reports occasional contractions but nothing regular.  Patient notes fetal movement.    Ultrasound today " demonstrates a normally grown fetus with no abnormality seen.  In particular there were no fetal markers for trisomy.  Amniotic fluid volume and umbilical artery Dopplers were normal.  Cervix was greater than 2.75 cm with no significant funneling.    Patient appears to be doing well with her cerclage.  From this point on we will be guided by symptoms rather than cervical lengths.  If the patient has regular cramping or contractions we would evaluate the patient to determine need for toco lysis.  We will rescan the patient again in 8 weeks time to assess fetal growth and wellbeing.    Patient was counseled extensively regarding  labor signs and symptoms.  She will contact her provider immediately if she notices any signs or symptoms of  labor.    Orders:  -     [facility]  Diagnostic Center; Future    2. Morbid obesity with BMI of 45.0-49.9, adult  -     [facility]  Diagnostic Center; Future    3. Previous  delivery affecting pregnancy  -     [facility]  Diagnostic Center; Future    4. Pregnancy, unspecified gestational age  -     [facility] [place]; Future         Follow Up  Return in about 8 weeks (around 9/10/2025).    I spent 10 minutes caring for the patient on the day of service. This included: obtaining or reviewing a separately obtained medical history, reviewing patient records, performing a medically appropriate exam and/or evaluation, counseling or educating the patient/family/caregiver, ordering medications, labs, and/or procedures and documenting such in the medical record. This does not include time spent on review and interpretation of other tests such as fetal ultrasound or the performance of other procedures such as amniocentesis or CVS.      Douglas A. Milligan, MD  Maternal Fetal Medicine, [facility] [place]     2025

## 2025-07-16 NOTE — ASSESSMENT & PLAN NOTE
Patient returns today for follow-up for pregnancy complicated by incompetent cervix.  Patient had a prophylactic cerclage placed earlier in pregnancy.  Patient reports occasional contractions but nothing regular.  Patient notes fetal movement.    Ultrasound today demonstrates a normally grown fetus with no abnormality seen.  In particular there were no fetal markers for trisomy.  Amniotic fluid volume and umbilical artery Dopplers were normal.  Cervix was greater than 2.75 cm with no significant funneling.    Patient appears to be doing well with her cerclage.  From this point on we will be guided by symptoms rather than cervical lengths.  If the patient has regular cramping or contractions we would evaluate the patient to determine need for toco lysis.  We will rescan the patient again in 8 weeks time to assess fetal growth and wellbeing.    Patient was counseled extensively regarding  labor signs and symptoms.  She will contact her provider immediately if she notices any signs or symptoms of  labor.

## (undated) DEVICE — TRY SPINE BLCK WHITACRE 25G 3X5IN

## (undated) DEVICE — GLV SURG EUDERMIC PF LTX 8 STRL

## (undated) DEVICE — COVER,LIGHT HANDLE,FLX,1/PK: Brand: MEDLINE INDUSTRIES, INC.

## (undated) DEVICE — SCRB SURG BACTOSHIELD CHG 4PCT 4OZ

## (undated) DEVICE — TOWEL,OR,DSP,ST,BLUE,STD,4/PK,20PK/CS: Brand: MEDLINE

## (undated) DEVICE — SUT MERSLN 5MM MO4 12IN WHT RS23

## (undated) DEVICE — STRAP STIRUP SLP RNG 19X3.5IN DISP

## (undated) DEVICE — MAT PREVALON MOBL TRANSFR AIR WO/PAD 39X80IN

## (undated) DEVICE — TRY SKINPREP DRYPREP

## (undated) DEVICE — SOL IRR NACL 0.9PCT BT 1000ML

## (undated) DEVICE — LEX VAG DELIVERY PACK: Brand: MEDLINE INDUSTRIES, INC.

## (undated) DEVICE — CATHETER,URETHRAL,REDRUBBER,STERILE,22FR: Brand: MEDLINE

## (undated) DEVICE — GOWN,REINF,POLY,ECL,PP SLV,XL: Brand: MEDLINE

## (undated) DEVICE — SUT PROLN 0 CT1 30IN 8424H

## (undated) DEVICE — SOL IRR SALINE BO 0.9% 100ML STRL